# Patient Record
Sex: MALE | Race: WHITE | NOT HISPANIC OR LATINO | Employment: FULL TIME | URBAN - METROPOLITAN AREA
[De-identification: names, ages, dates, MRNs, and addresses within clinical notes are randomized per-mention and may not be internally consistent; named-entity substitution may affect disease eponyms.]

---

## 2018-07-19 LAB — HEMOCCULT STL QL IA: NEGATIVE

## 2019-04-09 RX ORDER — FLUTICASONE PROPIONATE 50 MCG
SPRAY, SUSPENSION (ML) NASAL
Qty: 16 ML | Refills: 2 | Status: SHIPPED | OUTPATIENT
Start: 2019-04-09 | End: 2021-04-13

## 2019-05-20 ENCOUNTER — OFFICE VISIT (OUTPATIENT)
Dept: FAMILY MEDICINE | Facility: CLINIC | Age: 54
End: 2019-05-20
Payer: COMMERCIAL

## 2019-05-20 VITALS
TEMPERATURE: 98 F | DIASTOLIC BLOOD PRESSURE: 88 MMHG | OXYGEN SATURATION: 97 % | HEIGHT: 71 IN | HEART RATE: 81 BPM | WEIGHT: 141 LBS | BODY MASS INDEX: 19.74 KG/M2 | SYSTOLIC BLOOD PRESSURE: 108 MMHG

## 2019-05-20 DIAGNOSIS — E87.6 HYPOKALEMIA: Primary | ICD-10-CM

## 2019-05-20 DIAGNOSIS — J30.2 SEASONAL ALLERGIES: ICD-10-CM

## 2019-05-20 PROBLEM — I10 BENIGN ESSENTIAL HYPERTENSION: Status: ACTIVE | Noted: 2019-05-20

## 2019-05-20 PROBLEM — E78.00 PURE HYPERCHOLESTEROLEMIA: Status: ACTIVE | Noted: 2019-05-20

## 2019-05-20 PROBLEM — M25.569 KNEE PAIN: Status: ACTIVE | Noted: 2019-05-20

## 2019-05-20 PROBLEM — M54.2 NECK PAIN: Status: ACTIVE | Noted: 2019-05-20

## 2019-05-20 PROBLEM — R21 RASH: Status: ACTIVE | Noted: 2019-05-20

## 2019-05-20 PROCEDURE — 3079F PR MOST RECENT DIASTOLIC BLOOD PRESSURE 80-89 MM HG: ICD-10-PCS | Mod: CPTII,S$GLB,, | Performed by: NURSE PRACTITIONER

## 2019-05-20 PROCEDURE — 3074F PR MOST RECENT SYSTOLIC BLOOD PRESSURE < 130 MM HG: ICD-10-PCS | Mod: CPTII,S$GLB,, | Performed by: NURSE PRACTITIONER

## 2019-05-20 PROCEDURE — 99213 PR OFFICE/OUTPT VISIT, EST, LEVL III, 20-29 MIN: ICD-10-PCS | Mod: S$GLB,,, | Performed by: NURSE PRACTITIONER

## 2019-05-20 PROCEDURE — 3074F SYST BP LT 130 MM HG: CPT | Mod: CPTII,S$GLB,, | Performed by: NURSE PRACTITIONER

## 2019-05-20 PROCEDURE — 3008F BODY MASS INDEX DOCD: CPT | Mod: CPTII,S$GLB,, | Performed by: NURSE PRACTITIONER

## 2019-05-20 PROCEDURE — 3008F PR BODY MASS INDEX (BMI) DOCUMENTED: ICD-10-PCS | Mod: CPTII,S$GLB,, | Performed by: NURSE PRACTITIONER

## 2019-05-20 PROCEDURE — 3079F DIAST BP 80-89 MM HG: CPT | Mod: CPTII,S$GLB,, | Performed by: NURSE PRACTITIONER

## 2019-05-20 PROCEDURE — 99213 OFFICE O/P EST LOW 20 MIN: CPT | Mod: S$GLB,,, | Performed by: NURSE PRACTITIONER

## 2019-05-20 RX ORDER — LEVOCETIRIZINE DIHYDROCHLORIDE 5 MG/1
1 TABLET, FILM COATED ORAL DAILY PRN
COMMUNITY
End: 2019-09-11

## 2019-05-20 RX ORDER — POTASSIUM CHLORIDE 20 MEQ/1
2 TABLET, EXTENDED RELEASE ORAL DAILY PRN
COMMUNITY
End: 2019-05-23 | Stop reason: SDUPTHER

## 2019-05-20 NOTE — PROGRESS NOTES
Subjective:       Patient ID: Jose Acosta is a 53 y.o. male.    Chief Complaint: 3 mo follow up    HPI   Three month f/u for hypokalemia and seasonal allergies. He has been eating K fortified foods and taking daily supplement. No longer having cramps in extremities.     Currently on levocetirizine and fluticasone on demand for seasonal allergies. Allergy symptoms seemed to improve after January. No serious sinus related illnesses.     Review of Systems   Constitutional: Negative for activity change, appetite change, chills, fatigue and fever.   HENT: Positive for rhinorrhea. Negative for congestion, ear pain, facial swelling, sinus pressure, sinus pain and sneezing.    Respiratory: Negative for apnea, cough, chest tightness, shortness of breath and wheezing.    Cardiovascular: Negative for chest pain, palpitations and leg swelling.   Gastrointestinal: Negative for abdominal distention, abdominal pain, blood in stool, constipation, diarrhea and vomiting.   Genitourinary: Negative for difficulty urinating, dysuria, flank pain, frequency and urgency.   Musculoskeletal: Negative for arthralgias, back pain, gait problem, joint swelling and neck pain.   Skin: Negative for color change, pallor and rash.   Neurological: Negative for dizziness, tremors, syncope, weakness, light-headedness and numbness.   Hematological: Negative for adenopathy. Does not bruise/bleed easily.   Psychiatric/Behavioral: Negative for agitation, confusion and sleep disturbance. The patient is not nervous/anxious.        Objective:      Physical Exam   Constitutional: He is oriented to person, place, and time. Vital signs are normal. He appears well-developed and well-nourished.   HENT:   Head: Normocephalic and atraumatic.   Mouth/Throat: Oropharynx is clear and moist.   Eyes: Pupils are equal, round, and reactive to light. Conjunctivae are normal.   Neck: Trachea normal and normal range of motion. Neck supple. Carotid bruit is not present.    Cardiovascular: Normal rate, regular rhythm and normal heart sounds.   Pulmonary/Chest: Effort normal and breath sounds normal.   Abdominal: Soft. Bowel sounds are normal.   Musculoskeletal: Normal range of motion.   Neurological: He is alert and oriented to person, place, and time.   Skin: Skin is warm, dry and intact.   Psychiatric: He has a normal mood and affect. His speech is normal and behavior is normal. Judgment normal.       Assessment:       1. Hypokalemia    2. Seasonal allergies        Plan:       PROBLEM LIST     Jose was seen today for 3 mo follow up.    Diagnoses and all orders for this visit:    Hypokalemia  -     Comprehensive metabolic panel; Future  -     Comprehensive metabolic panel  Rechecking CMP; will notify w/ results    Seasonal allergies  Controlled well on current regimen; continue.

## 2019-05-23 RX ORDER — POTASSIUM CHLORIDE 20 MEQ/1
TABLET, EXTENDED RELEASE ORAL
Qty: 180 TABLET | Refills: 1 | Status: SHIPPED | OUTPATIENT
Start: 2019-05-23 | End: 2019-09-11

## 2019-08-13 ENCOUNTER — OFFICE VISIT (OUTPATIENT)
Dept: FAMILY MEDICINE | Facility: CLINIC | Age: 54
End: 2019-08-13
Payer: COMMERCIAL

## 2019-08-13 VITALS
DIASTOLIC BLOOD PRESSURE: 82 MMHG | SYSTOLIC BLOOD PRESSURE: 118 MMHG | TEMPERATURE: 98 F | BODY MASS INDEX: 19.99 KG/M2 | HEIGHT: 71 IN | RESPIRATION RATE: 18 BRPM | WEIGHT: 142.81 LBS | HEART RATE: 83 BPM | OXYGEN SATURATION: 98 %

## 2019-08-13 DIAGNOSIS — N49.2 NODULE OF SCROTUM: Primary | ICD-10-CM

## 2019-08-13 PROCEDURE — 99213 PR OFFICE/OUTPT VISIT, EST, LEVL III, 20-29 MIN: ICD-10-PCS | Mod: S$GLB,,, | Performed by: NURSE PRACTITIONER

## 2019-08-13 PROCEDURE — 3008F PR BODY MASS INDEX (BMI) DOCUMENTED: ICD-10-PCS | Mod: CPTII,S$GLB,, | Performed by: NURSE PRACTITIONER

## 2019-08-13 PROCEDURE — 99213 OFFICE O/P EST LOW 20 MIN: CPT | Mod: S$GLB,,, | Performed by: NURSE PRACTITIONER

## 2019-08-13 PROCEDURE — 3008F BODY MASS INDEX DOCD: CPT | Mod: CPTII,S$GLB,, | Performed by: NURSE PRACTITIONER

## 2019-08-13 RX ORDER — ONDANSETRON HYDROCHLORIDE 8 MG/1
1 TABLET, FILM COATED ORAL EVERY 8 HOURS PRN
COMMUNITY
End: 2019-09-11

## 2019-08-13 RX ORDER — BENZONATATE 200 MG/1
1 CAPSULE ORAL 3 TIMES DAILY PRN
COMMUNITY
End: 2019-09-11

## 2019-08-13 RX ORDER — KETOCONAZOLE 20 MG/ML
1 SHAMPOO, SUSPENSION TOPICAL DAILY
COMMUNITY
End: 2019-09-11

## 2019-08-13 NOTE — PROGRESS NOTES
"Subjective:       Patient ID: Jose Acosta is a 54 y.o. male.    Chief Complaint: Groin Pain (Pt c/o a "discomfort" in his groin behind his scrotum more on R side, for about 2 weeks. Stated "not exactly a pain, more of a discomfort when I'm sitting or walking." C/o can feel a lump there that is tender to touch.) and Cough (nasal and chest congestion with a lingering cough that won't clear up )    HPI   Groin Pain (Pt c/o a "discomfort" in his groin behind his scrotum more on R side, for about 2 weeks. Stated "not exactly a pain, more of a discomfort when I'm sitting or walking." C/o can feel a lump there that is tender to touch.) and Cough (nasal and chest congestion with a lingering cough that won't clear up )      Review of Systems   Constitutional: Negative.    HENT: Positive for congestion (improving). Negative for sinus pressure, sinus pain and sneezing.    Respiratory: Positive for cough. Negative for shortness of breath and wheezing.    Cardiovascular: Negative for chest pain.   Genitourinary: Negative for decreased urine volume, difficulty urinating, discharge, dysuria, enuresis, flank pain, frequency, genital sores, hematuria, penile pain, penile swelling, scrotal swelling, testicular pain and urgency.        Scrotal discomfort and nodule   Skin: Negative for color change, rash and wound.   Hematological: Negative for adenopathy.       Objective:      Physical Exam   Constitutional: He appears well-developed and well-nourished.   Neck: Normal range of motion. Neck supple.   Pulmonary/Chest: Effort normal.   Abdominal: Soft. Bowel sounds are normal.   Genitourinary: Rectum normal and penis normal. No penile tenderness.   Genitourinary Comments: Small 7 mm rubbery nodule right posterior scrotum, mild tenderness on palpation. Deep, no skin changes.        Assessment:       1. Nodule of scrotum        Plan:       PROBLEM LIST     Jose was seen today for groin pain and cough.    Diagnoses and all orders for this " visit:    Nodule of scrotum  -     US Scrotum And Testicles; Future  -     US Scrotum And Testicles    ordering US, no evidence of candidal intertrigo, abscess, or cellulitis. Results will dictate clinical course.

## 2019-08-19 DIAGNOSIS — N49.2 NODULE OF SCROTUM: Primary | ICD-10-CM

## 2019-09-11 ENCOUNTER — OFFICE VISIT (OUTPATIENT)
Dept: FAMILY MEDICINE | Facility: CLINIC | Age: 54
End: 2019-09-11
Payer: COMMERCIAL

## 2019-09-11 VITALS
HEART RATE: 84 BPM | BODY MASS INDEX: 19.32 KG/M2 | HEIGHT: 71 IN | DIASTOLIC BLOOD PRESSURE: 81 MMHG | OXYGEN SATURATION: 98 % | SYSTOLIC BLOOD PRESSURE: 120 MMHG | WEIGHT: 138 LBS | TEMPERATURE: 98 F | RESPIRATION RATE: 14 BRPM

## 2019-09-11 DIAGNOSIS — J01.10 ACUTE NON-RECURRENT FRONTAL SINUSITIS: Primary | ICD-10-CM

## 2019-09-11 PROCEDURE — 96372 PR INJECTION,THERAP/PROPH/DIAG2ST, IM OR SUBCUT: ICD-10-PCS | Mod: S$GLB,,, | Performed by: NURSE PRACTITIONER

## 2019-09-11 PROCEDURE — 99213 OFFICE O/P EST LOW 20 MIN: CPT | Mod: 25,S$GLB,, | Performed by: NURSE PRACTITIONER

## 2019-09-11 PROCEDURE — 99213 PR OFFICE/OUTPT VISIT, EST, LEVL III, 20-29 MIN: ICD-10-PCS | Mod: 25,S$GLB,, | Performed by: NURSE PRACTITIONER

## 2019-09-11 PROCEDURE — 3008F PR BODY MASS INDEX (BMI) DOCUMENTED: ICD-10-PCS | Mod: CPTII,S$GLB,, | Performed by: NURSE PRACTITIONER

## 2019-09-11 PROCEDURE — 96372 THER/PROPH/DIAG INJ SC/IM: CPT | Mod: S$GLB,,, | Performed by: NURSE PRACTITIONER

## 2019-09-11 PROCEDURE — 3008F BODY MASS INDEX DOCD: CPT | Mod: CPTII,S$GLB,, | Performed by: NURSE PRACTITIONER

## 2019-09-11 RX ORDER — MONTELUKAST SODIUM 10 MG/1
10 TABLET ORAL NIGHTLY
COMMUNITY
End: 2019-11-20 | Stop reason: SDUPTHER

## 2019-09-11 RX ORDER — AZITHROMYCIN 1 G/1
1 POWDER, FOR SUSPENSION ORAL ONCE
Qty: 1 PACKET | Refills: 0 | Status: SHIPPED | OUTPATIENT
Start: 2019-09-11 | End: 2019-09-11

## 2019-09-11 RX ORDER — BETAMETHASONE SODIUM PHOSPHATE AND BETAMETHASONE ACETATE 3; 3 MG/ML; MG/ML
6 INJECTION, SUSPENSION INTRA-ARTICULAR; INTRALESIONAL; INTRAMUSCULAR; SOFT TISSUE
Status: COMPLETED | OUTPATIENT
Start: 2019-09-11 | End: 2019-09-11

## 2019-09-11 RX ORDER — CIPROFLOXACIN 500 MG/1
500 TABLET ORAL
COMMUNITY
End: 2021-04-13

## 2019-09-11 RX ADMIN — BETAMETHASONE SODIUM PHOSPHATE AND BETAMETHASONE ACETATE 6 MG: 3; 3 INJECTION, SUSPENSION INTRA-ARTICULAR; INTRALESIONAL; INTRAMUSCULAR; SOFT TISSUE at 09:09

## 2019-09-11 NOTE — PATIENT INSTRUCTIONS
For nasal congestion:  Flonase nasal spray daily.  Saline nasal spray throughout the day can also help.  Sleep with head elevated to promote drainage.  Gargle with warm salt water for throat irritation.  Increase fluid intake to help break up secretions.  Rest

## 2019-09-11 NOTE — PROGRESS NOTES
Subjective:       Jose Acosta is a 54 y.o. male who presents for evaluation of sinus pain. Symptoms include: congestion, facial pain, nasal congestion and sinus pressure. Onset of symptoms was 6 days ago. Symptoms have been gradually worsening since that time. Past history is significant for no history of pneumonia or bronchitis. Patient is a non-smoker.    Review of Systems  A comprehensive review of systems was negative except for: Ears, nose, mouth, throat, and face: positive for nasal congestion and sore throat     Objective:      General appearance: alert, appears stated age and cooperative  Head: Normocephalic, without obvious abnormality, atraumatic  Eyes: conjunctivae/corneas clear. PERRL, EOM's intact. Fundi benign.  Ears: normal TM's and external ear canals both ears  Nose: Nares normal. Septum midline. Mucosa normal. Moderate congestion, turbinates pale, swollen, sinus tenderness bilateral  Throat: abnormal findings: moderate amount of clear drainage noted  Neck: no adenopathy, no JVD, supple, symmetrical, trachea midline and thyroid not enlarged, symmetric, no tenderness/mass/nodules  Lungs: clear to auscultation bilaterally  Heart: regular rate and rhythm, S1, S2 normal, no murmur, click, rub or gallop  Skin: Skin color, texture, turgor normal. No rashes or lesions  Lymph nodes: Cervical, supraclavicular, and axillary nodes normal.  Neurologic: Alert and oriented X 3, normal strength and tone. Normal symmetric reflexes. Normal coordination and gait      Assessment:      Acute bacterial sinusitis.      Plan:      Azithromycin per medication orders.  Follow up in if symptoms fail to resolve  or as needed.  or as needed.  [facility] 6mg IM in office today    Continue Benzonatate and Flonase as previously prescribed  For nasal congestion:  Saline nasal spray throughout the day can also help.  Sleep with head elevated to promote drainage.  Gargle with warm salt water for throat irritation.  Increase fluid  intake to help break up secretions.  Rest

## 2019-11-20 ENCOUNTER — OFFICE VISIT (OUTPATIENT)
Dept: FAMILY MEDICINE | Facility: CLINIC | Age: 54
End: 2019-11-20
Payer: COMMERCIAL

## 2019-11-20 VITALS
HEIGHT: 71 IN | HEART RATE: 77 BPM | TEMPERATURE: 99 F | DIASTOLIC BLOOD PRESSURE: 75 MMHG | SYSTOLIC BLOOD PRESSURE: 114 MMHG | BODY MASS INDEX: 19.69 KG/M2 | WEIGHT: 140.63 LBS | RESPIRATION RATE: 16 BRPM | OXYGEN SATURATION: 96 %

## 2019-11-20 DIAGNOSIS — N49.2 NODULE OF SCROTUM: ICD-10-CM

## 2019-11-20 DIAGNOSIS — B37.0 THRUSH, ORAL: ICD-10-CM

## 2019-11-20 DIAGNOSIS — J30.2 SEASONAL ALLERGIES: Primary | ICD-10-CM

## 2019-11-20 DIAGNOSIS — E87.6 HYPOKALEMIA: ICD-10-CM

## 2019-11-20 PROCEDURE — 3008F BODY MASS INDEX DOCD: CPT | Mod: CPTII,S$GLB,, | Performed by: NURSE PRACTITIONER

## 2019-11-20 PROCEDURE — 99213 PR OFFICE/OUTPT VISIT, EST, LEVL III, 20-29 MIN: ICD-10-PCS | Mod: S$GLB,,, | Performed by: NURSE PRACTITIONER

## 2019-11-20 PROCEDURE — 99213 OFFICE O/P EST LOW 20 MIN: CPT | Mod: S$GLB,,, | Performed by: NURSE PRACTITIONER

## 2019-11-20 PROCEDURE — 3008F PR BODY MASS INDEX (BMI) DOCUMENTED: ICD-10-PCS | Mod: CPTII,S$GLB,, | Performed by: NURSE PRACTITIONER

## 2019-11-20 RX ORDER — MONTELUKAST SODIUM 10 MG/1
10 TABLET ORAL NIGHTLY
Qty: 90 TABLET | Refills: 3 | Status: SHIPPED | OUTPATIENT
Start: 2019-11-20 | End: 2020-09-25 | Stop reason: SDUPTHER

## 2019-11-20 RX ORDER — FLUTICASONE PROPIONATE 50 MCG
2 SPRAY, SUSPENSION (ML) NASAL DAILY
Qty: 15.8 ML | Refills: 6 | Status: SHIPPED | OUTPATIENT
Start: 2019-11-20 | End: 2021-04-13 | Stop reason: SDUPTHER

## 2019-11-20 RX ORDER — NYSTATIN 100000 [USP'U]/ML
6 SUSPENSION ORAL
Qty: 240 ML | Refills: 0 | Status: SHIPPED | OUTPATIENT
Start: 2019-11-20 | End: 2019-11-30

## 2019-11-20 NOTE — PROGRESS NOTES
Subjective:       Patient ID: Jose Acosta is a 54 y.o. male.    Chief Complaint: Follow-up (6 mth f/u, Med refill)    HPI   Here for seasonal allergies; needs refills on singulair and fluticasone. Currently on levocetirizine and fluticasone on demand for seasonal allergies. Runny nose, irritated throat. No wheezing or coughing. No fever.     Nodule in scrotum resolved uneventfully. Did not have to see the urologist.     Three month f/u for hypokalemia and seasonal allergies. He has been eating K fortified foods and taking daily supplement. No longer having cramps in extremities.        Review of Systems   Constitutional: Negative for activity change, appetite change, chills, fatigue and fever.   HENT: Positive for rhinorrhea. Negative for congestion, ear pain, facial swelling, sinus pressure, sinus pain and sneezing.    Respiratory: Negative for apnea, cough, chest tightness, shortness of breath and wheezing.    Cardiovascular: Negative for chest pain, palpitations and leg swelling.   Gastrointestinal: Negative for abdominal distention, abdominal pain, blood in stool, constipation, diarrhea and vomiting.   Genitourinary: Negative for difficulty urinating, dysuria, flank pain, frequency and urgency.   Musculoskeletal: Negative for arthralgias, back pain, gait problem, joint swelling and neck pain.   Skin: Negative for color change, pallor and rash.   Neurological: Negative for dizziness, tremors, syncope, weakness, light-headedness and numbness.   Hematological: Negative for adenopathy. Does not bruise/bleed easily.   Psychiatric/Behavioral: Negative for agitation, confusion and sleep disturbance. The patient is not nervous/anxious.        Objective:      Physical Exam   Constitutional: He is oriented to person, place, and time. Vital signs are normal. He appears well-developed and well-nourished.   HENT:   Head: Normocephalic and atraumatic.   Nose: Rhinorrhea present. No mucosal edema.   Mouth/Throat: Oropharynx  is clear and moist.   Red, inflamed papillae back of tongue   Eyes: Pupils are equal, round, and reactive to light. Conjunctivae are normal.   Neck: Trachea normal and normal range of motion. Neck supple. Carotid bruit is not present.   Cardiovascular: Normal rate, regular rhythm and normal heart sounds.   Pulmonary/Chest: Effort normal and breath sounds normal.   Abdominal: Soft. Bowel sounds are normal.   Musculoskeletal: Normal range of motion.   Neurological: He is alert and oriented to person, place, and time.   Skin: Skin is warm, dry and intact.   Psychiatric: He has a normal mood and affect. His speech is normal and behavior is normal. Judgment normal.       Assessment:       1. Seasonal allergies    2. Thrush, oral    3. Hypokalemia    4. Nodule of scrotum        Plan:       PROBLEM LIST     Jose was seen today for follow-up.    Diagnoses and all orders for this visit:    Seasonal allergies  -     montelukast (SINGULAIR) 10 mg tablet; Take 1 tablet (10 mg total) by mouth every evening.  -     fluticasone propionate (FLONASE) 50 mcg/actuation nasal spray; 2 sprays (100 mcg total) by Each Nostril route once daily.    Thrush, oral  -     nystatin (MYCOSTATIN) 100,000 unit/mL suspension; Take 6 mLs (600,000 Units total) by mouth 4 (four) times daily with meals and nightly. for 10 days    Hypokalemia  Will contact [facility] to get results. Will notify w/ results.     Nodule of scrotum  Comments:  resolved

## 2019-12-16 PROBLEM — J01.10 ACUTE NON-RECURRENT FRONTAL SINUSITIS: Status: RESOLVED | Noted: 2019-09-11 | Resolved: 2019-12-16

## 2020-03-04 ENCOUNTER — PATIENT MESSAGE (OUTPATIENT)
Dept: FAMILY MEDICINE | Facility: CLINIC | Age: 55
End: 2020-03-04

## 2020-04-09 ENCOUNTER — OFFICE VISIT (OUTPATIENT)
Dept: FAMILY MEDICINE | Facility: CLINIC | Age: 55
End: 2020-04-09
Payer: COMMERCIAL

## 2020-04-09 DIAGNOSIS — M54.2 CERVICALGIA: Primary | ICD-10-CM

## 2020-04-09 DIAGNOSIS — M47.812 SPONDYLOSIS OF CERVICAL REGION WITHOUT MYELOPATHY OR RADICULOPATHY: ICD-10-CM

## 2020-04-09 PROCEDURE — 99214 OFFICE O/P EST MOD 30 MIN: CPT | Mod: 95,,, | Performed by: NURSE PRACTITIONER

## 2020-04-09 PROCEDURE — 99214 PR OFFICE/OUTPT VISIT, EST, LEVL IV, 30-39 MIN: ICD-10-PCS | Mod: 95,,, | Performed by: NURSE PRACTITIONER

## 2020-04-09 RX ORDER — CYCLOBENZAPRINE HCL 10 MG
10 TABLET ORAL 3 TIMES DAILY PRN
Qty: 60 TABLET | Refills: 0 | Status: SHIPPED | OUTPATIENT
Start: 2020-04-09 | End: 2021-04-13

## 2020-04-09 RX ORDER — METHYLPREDNISOLONE 4 MG/1
TABLET ORAL
Qty: 21 TABLET | Refills: 0 | Status: SHIPPED | OUTPATIENT
Start: 2020-04-09 | End: 2021-04-13 | Stop reason: SDUPTHER
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
## 2020-09-25 ENCOUNTER — PATIENT MESSAGE (OUTPATIENT)
Dept: FAMILY MEDICINE | Facility: CLINIC | Age: 55
End: 2020-09-25

## 2020-09-25 DIAGNOSIS — K21.9 GASTROESOPHAGEAL REFLUX DISEASE WITHOUT ESOPHAGITIS: ICD-10-CM

## 2020-09-25 DIAGNOSIS — H10.33 ACUTE CONJUNCTIVITIS OF BOTH EYES, UNSPECIFIED ACUTE CONJUNCTIVITIS TYPE: Primary | ICD-10-CM

## 2020-09-25 DIAGNOSIS — J30.2 SEASONAL ALLERGIES: ICD-10-CM

## 2020-09-25 RX ORDER — OFLOXACIN 3 MG/ML
1 SOLUTION/ DROPS OPHTHALMIC 4 TIMES DAILY
Qty: 5 ML | Refills: 0 | Status: SHIPPED | OUTPATIENT
Start: 2020-09-25 | End: 2021-04-13

## 2020-09-25 RX ORDER — FAMOTIDINE 40 MG/1
40 TABLET, FILM COATED ORAL DAILY
Qty: 90 TABLET | Refills: 3 | Status: SHIPPED | OUTPATIENT
Start: 2020-09-25 | End: 2021-04-13

## 2020-09-25 RX ORDER — MONTELUKAST SODIUM 10 MG/1
10 TABLET ORAL NIGHTLY
Qty: 90 TABLET | Refills: 3 | Status: SHIPPED | OUTPATIENT
Start: 2020-09-25 | End: 2021-07-08

## 2020-10-14 ENCOUNTER — PATIENT MESSAGE (OUTPATIENT)
Dept: FAMILY MEDICINE | Facility: CLINIC | Age: 55
End: 2020-10-14

## 2020-10-30 ENCOUNTER — PATIENT MESSAGE (OUTPATIENT)
Dept: ADMINISTRATIVE | Facility: HOSPITAL | Age: 55
End: 2020-10-30

## 2021-02-26 ENCOUNTER — PATIENT OUTREACH (OUTPATIENT)
Dept: ADMINISTRATIVE | Facility: HOSPITAL | Age: 56
End: 2021-02-26

## 2021-02-26 RX ORDER — ONDANSETRON HYDROCHLORIDE 8 MG/1
TABLET, FILM COATED ORAL
COMMUNITY
End: 2021-04-13

## 2021-02-26 RX ORDER — CYCLOBENZAPRINE HCL 10 MG
TABLET ORAL
COMMUNITY
End: 2021-04-13 | Stop reason: SDUPTHER

## 2021-02-26 RX ORDER — KETOCONAZOLE 20 MG/ML
SHAMPOO, SUSPENSION TOPICAL
COMMUNITY
End: 2021-07-08

## 2021-02-26 RX ORDER — POTASSIUM CHLORIDE 20 MEQ/1
TABLET, EXTENDED RELEASE ORAL
COMMUNITY
End: 2021-07-08 | Stop reason: SDUPTHER

## 2021-02-26 RX ORDER — BENZONATATE 200 MG/1
CAPSULE ORAL
COMMUNITY
End: 2021-07-08 | Stop reason: ALTCHOICE

## 2021-02-26 RX ORDER — FLUTICASONE PROPIONATE 50 MCG
SPRAY, SUSPENSION (ML) NASAL
COMMUNITY
End: 2021-04-13 | Stop reason: SDUPTHER

## 2021-02-26 RX ORDER — LEVOCETIRIZINE DIHYDROCHLORIDE 5 MG/1
TABLET, FILM COATED ORAL
COMMUNITY

## 2021-02-26 RX ORDER — BROMPHENIRAMINE MALEATE, PSEUDOEPHEDRINE HYDROCHLORIDE, AND DEXTROMETHORPHAN HYDROBROMIDE 2; 30; 10 MG/5ML; MG/5ML; MG/5ML
SYRUP ORAL
COMMUNITY
End: 2021-04-13

## 2021-03-17 ENCOUNTER — PATIENT MESSAGE (OUTPATIENT)
Dept: ADMINISTRATIVE | Facility: HOSPITAL | Age: 56
End: 2021-03-17

## 2021-03-17 ENCOUNTER — PATIENT MESSAGE (OUTPATIENT)
Dept: FAMILY MEDICINE | Facility: CLINIC | Age: 56
End: 2021-03-17

## 2021-03-21 ENCOUNTER — PATIENT MESSAGE (OUTPATIENT)
Dept: FAMILY MEDICINE | Facility: CLINIC | Age: 56
End: 2021-03-21

## 2021-04-13 ENCOUNTER — CLINICAL SUPPORT (OUTPATIENT)
Dept: FAMILY MEDICINE | Facility: CLINIC | Age: 56
End: 2021-04-13
Payer: COMMERCIAL

## 2021-04-13 ENCOUNTER — OFFICE VISIT (OUTPATIENT)
Dept: FAMILY MEDICINE | Facility: CLINIC | Age: 56
End: 2021-04-13
Payer: COMMERCIAL

## 2021-04-13 VITALS
BODY MASS INDEX: 19.46 KG/M2 | TEMPERATURE: 98 F | SYSTOLIC BLOOD PRESSURE: 127 MMHG | OXYGEN SATURATION: 95 % | DIASTOLIC BLOOD PRESSURE: 82 MMHG | HEART RATE: 97 BPM | HEIGHT: 71 IN | WEIGHT: 139 LBS

## 2021-04-13 DIAGNOSIS — R05.9 COUGH: Primary | ICD-10-CM

## 2021-04-13 DIAGNOSIS — R11.0 NAUSEA: ICD-10-CM

## 2021-04-13 DIAGNOSIS — Z20.822 ENCOUNTER FOR LABORATORY TESTING FOR COVID-19 VIRUS: ICD-10-CM

## 2021-04-13 DIAGNOSIS — K30 UPSET STOMACH: ICD-10-CM

## 2021-04-13 DIAGNOSIS — J20.9 ACUTE WHEEZY BRONCHITIS: Primary | ICD-10-CM

## 2021-04-13 PROCEDURE — 3008F BODY MASS INDEX DOCD: CPT | Mod: CPTII,S$GLB,, | Performed by: NURSE PRACTITIONER

## 2021-04-13 PROCEDURE — 99213 OFFICE O/P EST LOW 20 MIN: CPT | Mod: S$GLB,,, | Performed by: NURSE PRACTITIONER

## 2021-04-13 PROCEDURE — 99213 PR OFFICE/OUTPT VISIT, EST, LEVL III, 20-29 MIN: ICD-10-PCS | Mod: S$GLB,,, | Performed by: NURSE PRACTITIONER

## 2021-04-13 PROCEDURE — 99499 NO LOS: ICD-10-PCS | Mod: S$GLB,,, | Performed by: INTERNAL MEDICINE

## 2021-04-13 PROCEDURE — 87426 SARSCOV CORONAVIRUS AG IA: CPT | Mod: QW,S$GLB,, | Performed by: NURSE PRACTITIONER

## 2021-04-13 PROCEDURE — 87426 PR SARS-COV-2 COVID-19 IMMUNOASSAY QUAL/SEMIQUANT, MULT STEP METHOD: ICD-10-PCS | Mod: QW,S$GLB,, | Performed by: NURSE PRACTITIONER

## 2021-04-13 PROCEDURE — 3008F PR BODY MASS INDEX (BMI) DOCUMENTED: ICD-10-PCS | Mod: CPTII,S$GLB,, | Performed by: NURSE PRACTITIONER

## 2021-04-13 PROCEDURE — 99499 UNLISTED E&M SERVICE: CPT | Mod: S$GLB,,, | Performed by: INTERNAL MEDICINE

## 2021-04-13 RX ORDER — METHYLPREDNISOLONE 4 MG/1
TABLET ORAL
Qty: 21 TABLET | Refills: 0 | Status: SHIPPED | OUTPATIENT
Start: 2021-04-13 | End: 2021-07-08 | Stop reason: ALTCHOICE

## 2021-04-13 RX ORDER — PROMETHAZINE HYDROCHLORIDE AND DEXTROMETHORPHAN HYDROBROMIDE 6.25; 15 MG/5ML; MG/5ML
5 SYRUP ORAL EVERY 4 HOURS PRN
Qty: 240 ML | Refills: 0 | Status: SHIPPED | OUTPATIENT
Start: 2021-04-13 | End: 2021-04-23

## 2021-04-13 RX ORDER — BENZONATATE 200 MG/1
200 CAPSULE ORAL 3 TIMES DAILY PRN
Qty: 30 CAPSULE | Refills: 0 | Status: SHIPPED | OUTPATIENT
Start: 2021-04-13 | End: 2021-04-23

## 2021-04-13 RX ORDER — ONDANSETRON 8 MG/1
8 TABLET, ORALLY DISINTEGRATING ORAL 2 TIMES DAILY PRN
Qty: 20 TABLET | Refills: 0 | Status: SHIPPED | OUTPATIENT
Start: 2021-04-13 | End: 2021-07-08

## 2021-07-07 ENCOUNTER — TELEPHONE (OUTPATIENT)
Dept: FAMILY MEDICINE | Facility: CLINIC | Age: 56
End: 2021-07-07

## 2021-07-08 ENCOUNTER — OFFICE VISIT (OUTPATIENT)
Dept: FAMILY MEDICINE | Facility: CLINIC | Age: 56
End: 2021-07-08
Payer: COMMERCIAL

## 2021-07-08 VITALS
WEIGHT: 141.19 LBS | OXYGEN SATURATION: 98 % | TEMPERATURE: 98 F | HEIGHT: 71 IN | HEART RATE: 84 BPM | BODY MASS INDEX: 19.77 KG/M2 | SYSTOLIC BLOOD PRESSURE: 114 MMHG | DIASTOLIC BLOOD PRESSURE: 73 MMHG

## 2021-07-08 DIAGNOSIS — J02.9 SORE THROAT: Primary | ICD-10-CM

## 2021-07-08 DIAGNOSIS — J02.9 VIRAL PHARYNGITIS: ICD-10-CM

## 2021-07-08 DIAGNOSIS — E87.6 HYPOKALEMIA: ICD-10-CM

## 2021-07-08 LAB
CTP QC/QA: YES
S PYO RRNA THROAT QL PROBE: NEGATIVE

## 2021-07-08 PROCEDURE — 3008F PR BODY MASS INDEX (BMI) DOCUMENTED: ICD-10-PCS | Mod: CPTII,S$GLB,, | Performed by: NURSE PRACTITIONER

## 2021-07-08 PROCEDURE — 96372 THER/PROPH/DIAG INJ SC/IM: CPT | Mod: S$GLB,,, | Performed by: NURSE PRACTITIONER

## 2021-07-08 PROCEDURE — 87880 STREP A ASSAY W/OPTIC: CPT | Mod: QW,,, | Performed by: NURSE PRACTITIONER

## 2021-07-08 PROCEDURE — 99213 OFFICE O/P EST LOW 20 MIN: CPT | Mod: 25,S$GLB,, | Performed by: NURSE PRACTITIONER

## 2021-07-08 PROCEDURE — 3008F BODY MASS INDEX DOCD: CPT | Mod: CPTII,S$GLB,, | Performed by: NURSE PRACTITIONER

## 2021-07-08 PROCEDURE — 99213 PR OFFICE/OUTPT VISIT, EST, LEVL III, 20-29 MIN: ICD-10-PCS | Mod: 25,S$GLB,, | Performed by: NURSE PRACTITIONER

## 2021-07-08 PROCEDURE — 96372 PR INJECTION,THERAP/PROPH/DIAG2ST, IM OR SUBCUT: ICD-10-PCS | Mod: S$GLB,,, | Performed by: NURSE PRACTITIONER

## 2021-07-08 PROCEDURE — 87880 POCT RAPID STREP A: ICD-10-PCS | Mod: QW,,, | Performed by: NURSE PRACTITIONER

## 2021-07-08 RX ORDER — DEXAMETHASONE SODIUM PHOSPHATE 4 MG/ML
4 INJECTION, SOLUTION INTRA-ARTICULAR; INTRALESIONAL; INTRAMUSCULAR; INTRAVENOUS; SOFT TISSUE
Status: COMPLETED | OUTPATIENT
Start: 2021-07-08 | End: 2021-07-08

## 2021-07-08 RX ORDER — POTASSIUM CHLORIDE 20 MEQ/1
TABLET, EXTENDED RELEASE ORAL
Qty: 90 TABLET | Refills: 1 | Status: SHIPPED | OUTPATIENT
Start: 2021-07-08 | End: 2021-09-17 | Stop reason: SDUPTHER

## 2021-07-08 RX ADMIN — DEXAMETHASONE SODIUM PHOSPHATE 4 MG: 4 INJECTION, SOLUTION INTRA-ARTICULAR; INTRALESIONAL; INTRAMUSCULAR; INTRAVENOUS; SOFT TISSUE at 03:07

## 2021-09-15 ENCOUNTER — PATIENT MESSAGE (OUTPATIENT)
Dept: FAMILY MEDICINE | Facility: CLINIC | Age: 56
End: 2021-09-15

## 2021-09-17 DIAGNOSIS — E87.6 HYPOKALEMIA: ICD-10-CM

## 2021-09-17 RX ORDER — POTASSIUM CHLORIDE 20 MEQ/1
20 TABLET, EXTENDED RELEASE ORAL DAILY
Qty: 90 TABLET | Refills: 3 | Status: SHIPPED | OUTPATIENT
Start: 2021-09-17 | End: 2022-08-02 | Stop reason: SDUPTHER

## 2021-12-13 ENCOUNTER — TELEPHONE (OUTPATIENT)
Dept: FAMILY MEDICINE | Facility: CLINIC | Age: 56
End: 2021-12-13

## 2021-12-13 ENCOUNTER — OFFICE VISIT (OUTPATIENT)
Dept: FAMILY MEDICINE | Facility: CLINIC | Age: 56
End: 2021-12-13
Payer: COMMERCIAL

## 2021-12-13 ENCOUNTER — TELEPHONE (OUTPATIENT)
Dept: FAMILY MEDICINE | Facility: CLINIC | Age: 56
End: 2021-12-13
Payer: COMMERCIAL

## 2021-12-13 VITALS
WEIGHT: 145 LBS | HEIGHT: 71 IN | HEART RATE: 82 BPM | OXYGEN SATURATION: 95 % | RESPIRATION RATE: 18 BRPM | BODY MASS INDEX: 20.3 KG/M2 | DIASTOLIC BLOOD PRESSURE: 80 MMHG | TEMPERATURE: 98 F | SYSTOLIC BLOOD PRESSURE: 126 MMHG

## 2021-12-13 DIAGNOSIS — Z12.5 PROSTATE CANCER SCREENING: ICD-10-CM

## 2021-12-13 DIAGNOSIS — Z11.59 ENCOUNTER FOR SCREENING FOR OTHER VIRAL DISEASES: ICD-10-CM

## 2021-12-13 DIAGNOSIS — H00.012 HORDEOLUM OF RIGHT LOWER EYELID, UNSPECIFIED HORDEOLUM TYPE: Primary | ICD-10-CM

## 2021-12-13 DIAGNOSIS — Z00.00 ROUTINE ADULT HEALTH MAINTENANCE: ICD-10-CM

## 2021-12-13 PROCEDURE — 99213 OFFICE O/P EST LOW 20 MIN: CPT | Mod: S$GLB,,, | Performed by: NURSE PRACTITIONER

## 2021-12-13 PROCEDURE — 99213 PR OFFICE/OUTPT VISIT, EST, LEVL III, 20-29 MIN: ICD-10-PCS | Mod: S$GLB,,, | Performed by: NURSE PRACTITIONER

## 2021-12-13 RX ORDER — ERYTHROMYCIN 5 MG/G
OINTMENT OPHTHALMIC 3 TIMES DAILY
Qty: 1 G | Refills: 0 | Status: SHIPPED | OUTPATIENT
Start: 2021-12-13 | End: 2022-01-24

## 2022-01-10 ENCOUNTER — PATIENT MESSAGE (OUTPATIENT)
Dept: FAMILY MEDICINE | Facility: CLINIC | Age: 57
End: 2022-01-10
Payer: COMMERCIAL

## 2022-01-11 ENCOUNTER — PATIENT MESSAGE (OUTPATIENT)
Dept: FAMILY MEDICINE | Facility: CLINIC | Age: 57
End: 2022-01-11
Payer: COMMERCIAL

## 2022-01-11 LAB
ABS NRBC COUNT: 0 X 10 3/UL (ref 0–0.01)
ABSOLUTE BASOPHIL: 0.04 X 10 3/UL (ref 0–0.22)
ABSOLUTE EOSINOPHIL: 0.45 X 10 3/UL (ref 0.04–0.54)
ABSOLUTE IMMATURE GRAN: 0.07 X 10 3/UL (ref 0–0.04)
ABSOLUTE LYMPHOCYTE: 2.82 X 10 3/UL (ref 0.86–4.75)
ABSOLUTE MONOCYTE: 0.75 X 10 3/UL (ref 0.22–1.08)
ALBUMIN SERPL-MCNC: 4.5 G/DL (ref 3.5–5.2)
ALBUMIN/GLOB SERPL ELPH: 1.4 {RATIO} (ref 1–2.7)
ALP ISOS SERPL LEV INH-CCNC: 81 U/L (ref 40–130)
ALT (SGPT): 18 U/L (ref 0–41)
ANION GAP SERPL CALC-SCNC: 10 MMOL/L (ref 8–17)
AST SERPL-CCNC: 13 U/L (ref 0–40)
BASOPHILS NFR BLD: 0.5 % (ref 0.2–1.2)
BILIRUBIN, TOTAL: 0.29 MG/DL (ref 0–1.2)
BUN/CREAT SERPL: 14.3 (ref 6–20)
CALCIUM SERPL-MCNC: 9.3 MG/DL (ref 8.6–10.2)
CARBON DIOXIDE, CO2: 31 MMOL/L (ref 22–29)
CHLORIDE: 100 MMOL/L (ref 98–107)
CHOLEST SERPL-MSCNC: 221 MG/DL (ref 100–200)
CREAT SERPL-MCNC: 1.15 MG/DL (ref 0.7–1.2)
EOSINOPHIL NFR BLD: 6.1 % (ref 0.7–7)
GFR ESTIMATION: 66.27
GLOBULIN: 3.3 G/DL (ref 1.5–4.5)
GLUCOSE: 100 MG/DL (ref 74–106)
HCT VFR BLD AUTO: 53.4 % (ref 42–52)
HCV IGG SERPL QL IA: NONREACTIVE
HDLC SERPL-MCNC: 51 MG/DL
HGB BLD-MCNC: 17.1 G/DL (ref 14–18)
HIV 1+2 AB+HIV1 P24 AG SERPL QL IA: NONREACTIVE
IMMATURE GRANULOCYTES: 1 % (ref 0–0.5)
LDL/HDL RATIO: 2.7 (ref 1–3)
LDLC SERPL CALC-MCNC: 140.2 MG/DL (ref 0–100)
LYMPHOCYTES NFR BLD: 38.5 % (ref 19.3–53.1)
MCH RBC QN AUTO: 28.6 PG (ref 27–32)
MCHC RBC AUTO-ENTMCNC: 32 G/DL (ref 32–36)
MCV RBC AUTO: 89.3 FL (ref 80–94)
MONOCYTES NFR BLD: 10.2 % (ref 4.7–12.5)
NEUTROPHILS # BLD AUTO: 3.2 X 10 3/UL (ref 2.15–7.56)
NEUTROPHILS NFR BLD: 43.7 % (ref 34–71.1)
NUCLEATED RED BLOOD CELLS: 0 /100 WBC (ref 0–0.2)
PLATELET # BLD AUTO: 352 X 10 3/UL (ref 135–400)
POTASSIUM: 4.4 MMOL/L (ref 3.5–5.1)
PROT SNV-MCNC: 7.8 G/DL (ref 6.4–8.3)
PSA, DIAGNOSTIC: 1.45 NG/ML (ref 0–4)
RBC # BLD AUTO: 5.98 X 10 6/UL (ref 4.7–6.1)
RDW-SD: 42 FL (ref 37–54)
SODIUM: 141 MMOL/L (ref 136–145)
TRIGL SERPL-MCNC: 149 MG/DL (ref 0–150)
TSH W/REFLEX TO FT4: 2.1 UIU/ML (ref 0.27–4.2)
UREA NITROGEN (BUN): 16.5 MG/DL (ref 6–20)
WBC # BLD: 7.33 X 10 3/UL (ref 4.3–10.8)

## 2022-01-21 ENCOUNTER — OFFICE VISIT (OUTPATIENT)
Dept: FAMILY MEDICINE | Facility: CLINIC | Age: 57
End: 2022-01-21
Payer: COMMERCIAL

## 2022-01-21 VITALS
DIASTOLIC BLOOD PRESSURE: 72 MMHG | RESPIRATION RATE: 18 BRPM | WEIGHT: 144 LBS | TEMPERATURE: 99 F | SYSTOLIC BLOOD PRESSURE: 135 MMHG | HEART RATE: 87 BPM | OXYGEN SATURATION: 98 % | HEIGHT: 71 IN | BODY MASS INDEX: 20.16 KG/M2

## 2022-01-21 DIAGNOSIS — M54.2 NECK PAIN: Primary | ICD-10-CM

## 2022-01-21 DIAGNOSIS — E78.00 PURE HYPERCHOLESTEROLEMIA: Chronic | ICD-10-CM

## 2022-01-21 DIAGNOSIS — M50.30 DDD (DEGENERATIVE DISC DISEASE), CERVICAL: ICD-10-CM

## 2022-01-21 DIAGNOSIS — Z12.11 COLON CANCER SCREENING: ICD-10-CM

## 2022-01-21 PROCEDURE — 3078F DIAST BP <80 MM HG: CPT | Mod: CPTII,S$GLB,, | Performed by: NURSE PRACTITIONER

## 2022-01-21 PROCEDURE — 3008F BODY MASS INDEX DOCD: CPT | Mod: CPTII,S$GLB,, | Performed by: NURSE PRACTITIONER

## 2022-01-21 PROCEDURE — 3078F PR MOST RECENT DIASTOLIC BLOOD PRESSURE < 80 MM HG: ICD-10-PCS | Mod: CPTII,S$GLB,, | Performed by: NURSE PRACTITIONER

## 2022-01-21 PROCEDURE — 3075F SYST BP GE 130 - 139MM HG: CPT | Mod: CPTII,S$GLB,, | Performed by: NURSE PRACTITIONER

## 2022-01-21 PROCEDURE — 3075F PR MOST RECENT SYSTOLIC BLOOD PRESS GE 130-139MM HG: ICD-10-PCS | Mod: CPTII,S$GLB,, | Performed by: NURSE PRACTITIONER

## 2022-01-21 PROCEDURE — 1159F MED LIST DOCD IN RCRD: CPT | Mod: CPTII,S$GLB,, | Performed by: NURSE PRACTITIONER

## 2022-01-21 PROCEDURE — 3008F PR BODY MASS INDEX (BMI) DOCUMENTED: ICD-10-PCS | Mod: CPTII,S$GLB,, | Performed by: NURSE PRACTITIONER

## 2022-01-21 PROCEDURE — 1159F PR MEDICATION LIST DOCUMENTED IN MEDICAL RECORD: ICD-10-PCS | Mod: CPTII,S$GLB,, | Performed by: NURSE PRACTITIONER

## 2022-01-21 PROCEDURE — 99213 OFFICE O/P EST LOW 20 MIN: CPT | Mod: S$GLB,,, | Performed by: NURSE PRACTITIONER

## 2022-01-21 PROCEDURE — 99213 PR OFFICE/OUTPT VISIT, EST, LEVL III, 20-29 MIN: ICD-10-PCS | Mod: S$GLB,,, | Performed by: NURSE PRACTITIONER

## 2022-01-21 RX ORDER — CYCLOBENZAPRINE HCL 10 MG
10 TABLET ORAL 2 TIMES DAILY PRN
Qty: 60 TABLET | Refills: 5 | Status: SHIPPED | OUTPATIENT
Start: 2022-01-21 | End: 2022-08-02 | Stop reason: SDUPTHER

## 2022-01-21 RX ORDER — CELECOXIB 100 MG/1
100 CAPSULE ORAL 2 TIMES DAILY
Qty: 60 CAPSULE | Refills: 5 | Status: SHIPPED | OUTPATIENT
Start: 2022-01-21 | End: 2022-08-02 | Stop reason: SDUPTHER
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
## 2022-01-24 ENCOUNTER — PATIENT MESSAGE (OUTPATIENT)
Dept: FAMILY MEDICINE | Facility: CLINIC | Age: 57
End: 2022-01-24
Payer: COMMERCIAL

## 2022-02-11 LAB — NONINV COLON CA DNA+OCC BLD SCRN STL QL: NEGATIVE

## 2022-04-29 ENCOUNTER — OFFICE VISIT (OUTPATIENT)
Dept: UROLOGY | Facility: CLINIC | Age: 57
End: 2022-04-29
Payer: COMMERCIAL

## 2022-04-29 VITALS
HEART RATE: 92 BPM | HEIGHT: 71 IN | BODY MASS INDEX: 18.9 KG/M2 | SYSTOLIC BLOOD PRESSURE: 110 MMHG | DIASTOLIC BLOOD PRESSURE: 71 MMHG | WEIGHT: 135 LBS

## 2022-04-29 DIAGNOSIS — R39.198 DIFFICULTY VOIDING: Primary | ICD-10-CM

## 2022-04-29 DIAGNOSIS — K59.00 CONSTIPATION, UNSPECIFIED CONSTIPATION TYPE: ICD-10-CM

## 2022-04-29 DIAGNOSIS — R31.29 HEMATURIA, MICROSCOPIC: ICD-10-CM

## 2022-04-29 LAB — POC RESIDUAL URINE VOLUME: 0 ML (ref 0–100)

## 2022-04-29 PROCEDURE — 3074F SYST BP LT 130 MM HG: CPT | Mod: CPTII,S$GLB,, | Performed by: NURSE PRACTITIONER

## 2022-04-29 PROCEDURE — 1160F PR REVIEW ALL MEDS BY PRESCRIBER/CLIN PHARMACIST DOCUMENTED: ICD-10-PCS | Mod: CPTII,S$GLB,, | Performed by: NURSE PRACTITIONER

## 2022-04-29 PROCEDURE — 51798 US URINE CAPACITY MEASURE: CPT | Mod: S$GLB,,, | Performed by: NURSE PRACTITIONER

## 2022-04-29 PROCEDURE — 3078F PR MOST RECENT DIASTOLIC BLOOD PRESSURE < 80 MM HG: ICD-10-PCS | Mod: CPTII,S$GLB,, | Performed by: NURSE PRACTITIONER

## 2022-04-29 PROCEDURE — 1160F RVW MEDS BY RX/DR IN RCRD: CPT | Mod: CPTII,S$GLB,, | Performed by: NURSE PRACTITIONER

## 2022-04-29 PROCEDURE — 99204 OFFICE O/P NEW MOD 45 MIN: CPT | Mod: S$GLB,,, | Performed by: NURSE PRACTITIONER

## 2022-04-29 PROCEDURE — 1159F PR MEDICATION LIST DOCUMENTED IN MEDICAL RECORD: ICD-10-PCS | Mod: CPTII,S$GLB,, | Performed by: NURSE PRACTITIONER

## 2022-04-29 PROCEDURE — 3008F BODY MASS INDEX DOCD: CPT | Mod: CPTII,S$GLB,, | Performed by: NURSE PRACTITIONER

## 2022-04-29 PROCEDURE — 3078F DIAST BP <80 MM HG: CPT | Mod: CPTII,S$GLB,, | Performed by: NURSE PRACTITIONER

## 2022-04-29 PROCEDURE — 3008F PR BODY MASS INDEX (BMI) DOCUMENTED: ICD-10-PCS | Mod: CPTII,S$GLB,, | Performed by: NURSE PRACTITIONER

## 2022-04-29 PROCEDURE — 3074F PR MOST RECENT SYSTOLIC BLOOD PRESSURE < 130 MM HG: ICD-10-PCS | Mod: CPTII,S$GLB,, | Performed by: NURSE PRACTITIONER

## 2022-04-29 PROCEDURE — 1159F MED LIST DOCD IN RCRD: CPT | Mod: CPTII,S$GLB,, | Performed by: NURSE PRACTITIONER

## 2022-04-29 PROCEDURE — 51798 POCT BLADDER SCAN: ICD-10-PCS | Mod: S$GLB,,, | Performed by: NURSE PRACTITIONER

## 2022-04-29 PROCEDURE — 99204 PR OFFICE/OUTPT VISIT, NEW, LEVL IV, 45-59 MIN: ICD-10-PCS | Mod: S$GLB,,, | Performed by: NURSE PRACTITIONER

## 2022-04-29 RX ORDER — TAMSULOSIN HYDROCHLORIDE 0.4 MG/1
0.4 CAPSULE ORAL DAILY
Qty: 90 CAPSULE | Refills: 3 | Status: SHIPPED | OUTPATIENT
Start: 2022-04-29 | End: 2022-06-10 | Stop reason: SDUPTHER

## 2022-04-29 RX ORDER — TAMSULOSIN HYDROCHLORIDE 0.4 MG/1
CAPSULE ORAL DAILY
COMMUNITY
End: 2022-04-29 | Stop reason: SDUPTHER

## 2022-04-29 NOTE — PROGRESS NOTES
Subjective:       Patient ID: Jose Acosta is a 56 y.o. male.    Chief Complaint: Other (Difficulty with voiding/pt reports he saw us in old building)      HPI: 56-year-old male, new to [facility], presents with complaint of difficulty voiding.  Patient states he is having difficulty voiding.  States he is having frequency with decreased output.  Patient has an old prescription of Flomax 0.4 mg daily.  He states he started taking this and had some improvement however he feels like he still has frequency with decreased output.    He denies any pain or burning urination.  Denies any odor urine.  Denies any fever.  Denies any body aches.  He states 18 cc blood in his urine.  He states his urine will be dark.  Does not describe it as red.    Patient states he has constipation issues.  States he tends to have urinary issues with constipation.    Patient does have a history of smoking.  He quit smoking approximately 20 years ago.  However, he smoked for approximately 15 years and would smoke 2+ packs per day.    No other urinary complaints at this time.       Past Medical History:   Past Medical History:   Diagnosis Date    Allergy     Chronic constipation     Rheumatoid arthritis involving multiple sites        Past Surgical Historical: History reviewed. No pertinent surgical history.     Medications:   Medication List with Changes/Refills   Current Medications    CELECOXIB (CELEBREX) 100 MG CAPSULE    Take 1 capsule (100 mg total) by mouth 2 (two) times daily.    CYCLOBENZAPRINE (FLEXERIL) 10 MG TABLET    Take 1 tablet (10 mg total) by mouth 2 (two) times daily as needed for Muscle spasms.    LEVOCETIRIZINE (XYZAL) 5 MG TABLET    levocetirizine 5 mg tablet   TAKE 1 TABLET BY MOUTH EVERY DAY IN THE EVENING    POTASSIUM CHLORIDE SA (K-DUR,KLOR-CON) 20 MEQ TABLET    Take 1 tablet (20 mEq total) by mouth once daily. potassium chloride ER 20 mEq tablet,extended release(part/cryst)   Changed and/or Refilled  Medications    Modified Medication Previous Medication    TAMSULOSIN (FLOMAX) 0.4 MG CAP tamsulosin (FLOMAX) 0.4 mg Cap       Take 1 capsule (0.4 mg total) by mouth once daily.    Take by mouth once daily.        Past Social History:   Social History     Socioeconomic History    Marital status:    Occupational History    Occupation:    Tobacco Use    Smoking status: Never Smoker    Smokeless tobacco: Never Used   Substance and Sexual Activity    Alcohol use: Not Currently     Comment: rarely    Drug use: Not Currently       Allergies:   Review of patient's allergies indicates:   Allergen Reactions    Codeine         Family History:   Family History   Problem Relation Age of Onset    Cancer Father     No Known Problems Mother         Review of Systems:  Review of Systems   Constitutional: Negative for activity change and appetite change.   HENT: Negative for congestion and dental problem.    Eyes: Negative for visual disturbance.   Respiratory: Negative for chest tightness and shortness of breath.    Cardiovascular: Negative for chest pain.   Gastrointestinal: Negative for abdominal distention and abdominal pain.   Genitourinary: Positive for difficulty urinating and frequency. Negative for decreased urine volume, dysuria, enuresis, flank pain, genital sores, hematuria, penile discharge, penile pain, penile swelling, scrotal swelling, testicular pain and urgency.   Musculoskeletal: Negative for back pain and neck pain.   Skin: Negative for color change.   Neurological: Negative for dizziness.   Hematological: Negative for adenopathy.   Psychiatric/Behavioral: Negative for agitation, behavioral problems and confusion.       Physical Exam:  Physical Exam  Vitals and nursing note reviewed.   Constitutional:       Appearance: He is well-developed.   HENT:      Head: Normocephalic.   Eyes:      Pupils: Pupils are equal, round, and reactive to light.   Cardiovascular:      Rate and Rhythm:  Normal rate and regular rhythm.      Heart sounds: Normal heart sounds.   Pulmonary:      Effort: Pulmonary effort is normal.      Breath sounds: Normal breath sounds.   Abdominal:      General: Bowel sounds are normal.      Palpations: Abdomen is soft.   Musculoskeletal:         General: Normal range of motion.      Cervical back: Normal range of motion and neck supple.   Skin:     General: Skin is warm and dry.   Neurological:      Mental Status: He is alert and oriented to person, place, and time.   Psychiatric:         Behavior: Behavior normal.       Urinalysis:  Small blood, red blood cells 10-20.  Bladder scan:  0 cc    Assessment/Plan:   1. Difficulty voiding:  Patient notes improvement with Flomax 0.4 mg daily.  States his medication is old and would like a new prescription.  Patient provided new prescription of Flomax 0.4 mg daily.  Did discuss the possibility of overactive bladder.  Also discussed constipation contributing.  Patient advised use over-the-counter MiraLax to help with constipation.  He is also advised to follow-up with GI doctor if he feels like this is a constant problem.    2. Microscopic hematuria:  Patient has small blood with red blood cells 10-20.  He is a former heavy smoker.  Will schedule patient for CT urogram and cysto.    3. Constipation:  Patient  advised use over-the-counter medications and follow-up with GI doctor.    Follow-up will arrange pending workup.  Patient advised to follow-up sooner if no improvement or symptoms get worse.  Problem List Items Addressed This Visit    None     Visit Diagnoses     Difficulty voiding    -  Primary    Relevant Medications    tamsulosin (FLOMAX) 0.4 mg Cap    Other Relevant Orders    POCT Urinalysis (w/Micro Option)    POCT Bladder Scan    Hematuria, microscopic        Relevant Orders    CT Urogram Abd Pelvis W WO    Cystoscopy    Constipation, unspecified constipation type

## 2022-05-20 ENCOUNTER — TELEPHONE (OUTPATIENT)
Dept: UROLOGY | Facility: CLINIC | Age: 57
End: 2022-05-20
Payer: COMMERCIAL

## 2022-05-20 NOTE — TELEPHONE ENCOUNTER
Patient notified of results and proceed with cysto. [facility] LPN    ----- Message from Bishnu Adame NP sent at 5/19/2022  1:11 PM CDT -----  2 small 8 mm lesions in right kidney.  Too small to characterize.  Will repeat CT in 6 months.    Proceed with cysto as scheduled.

## 2022-06-09 ENCOUNTER — PROCEDURE VISIT (OUTPATIENT)
Dept: UROLOGY | Facility: CLINIC | Age: 57
End: 2022-06-09
Payer: COMMERCIAL

## 2022-06-09 VITALS
HEART RATE: 83 BPM | BODY MASS INDEX: 19.88 KG/M2 | WEIGHT: 142 LBS | HEIGHT: 71 IN | SYSTOLIC BLOOD PRESSURE: 132 MMHG | DIASTOLIC BLOOD PRESSURE: 72 MMHG | RESPIRATION RATE: 20 BRPM | OXYGEN SATURATION: 99 %

## 2022-06-09 DIAGNOSIS — R31.29 HEMATURIA, MICROSCOPIC: ICD-10-CM

## 2022-06-09 PROCEDURE — 52000 CYSTOSCOPY: ICD-10-PCS | Mod: S$GLB,,, | Performed by: UROLOGY

## 2022-06-09 PROCEDURE — 52000 CYSTOURETHROSCOPY: CPT | Mod: S$GLB,,, | Performed by: UROLOGY

## 2022-06-09 NOTE — PROCEDURES
Cystoscopy    Date/Time: 6/9/2022 9:30 AM  Performed by: Benedicto Lindsey MD  Authorized by: Benedicto Lindsey MD     Consent Done?:  Yes (Written)  Timeout: prior to procedure the correct patient, procedure, and site was verified    Prep: patient was prepped and draped in usual sterile fashion    Anesthesia:  Intraurethral instillation  Indications: hematuria    Position:  Supine  Anesthesia:  Intraurethral instillation  Patient sedated?: No    Preparation: Patient was prepped and draped in usual sterile fashion    Scope type:  Flexible cystoscope  External exam normal: Yes    Urethra normal: Yes    Prostate normal: Yes    Comments:      Patient was brought to the procedure room placed on the table padded prepped and draped in usual sterile fashion in supine position. The cystoscope was inserted into the urethra and advanced the urethra was normal prostate showed expected enlargement for patient's age, the bladder is entered and inspected is found to be free of tumor stone or foreign body.  Bilateral ureteral orifices were identified and noted to be normal in appearance with clear efflux of urine at this point the scope was removed the patient tolerated the procedure well there were no complications    Impression:  Essentially a normal cystoscopy patient has an 8 mm lesion on his kidneys which he will need 6 month CT follow-up for.  Return to clinic in 6 months for this

## 2022-06-09 NOTE — PATIENT INSTRUCTIONS
Patient Education       Cystoscopy Discharge Instructions   About this topic   Your kidneys make urine. It is stored in your bladder. The urethra is a tube at the bottom of the bladder. Urine flows out of this tube. Sometimes, there is a blockage and urine is not able to leave the body.  A cystoscopy is a procedure that lets the doctor see the inside of your bladder and urethra. The doctor does it to:  Look for stones or tumors blocking the bladder and urethra  Look for changes or injury inside the bladder  Take a tissue sample from the inside of your bladder  Look for reasons for blood in the urine, pain with urination, or why you are passing urine often  Look for prostate problems     What care is needed at home?   Ask your doctor what you need to do when you go home. Make sure you ask questions if you do not understand what the doctor says. This way you will know what you need to do.  Take a warm bath or use a warm wet washcloth over the opening to the urethra. This will help to ease any pain. Do this as needed.  Drink 6 to 8 glasses of water a day and 3 to 4 glasses in the first few hours after the procedure to flush out your bladder and reduce irritation.  You may see some blood in your urine for a few days. This is normal.  Empty your bladder as soon as you feel the need to. Don't delay going to the bathroom. It stretches and weakens the bladder.  What follow-up care is needed?   Your doctor may ask you to make visits to the office to check on your progress. Be sure to keep these visits.  If you had a biopsy, talk with your doctor about the results.  What drugs may be needed?   The doctor may order drugs to:  Help with pain  Fight an infection  Help with bladder spasms  Will physical activity be limited?   Talk to your doctor about when you may go back to your normal activities like work, driving, or sex.  What problems could happen?   Bleeding  Infection  Injury to the bladder and urethra  Discomfort in the  urethra area  Burning sensation for a short time  Upset stomach  When do I need to call the doctor?   Signs of infection. These include a fever of 100.4°F (38°C) or higher, chills, pain with passing urine.  Pain that does not go away even with drugs or that lasts longer than 2 days  Too much blood in your urine  Passing large dime-sized clots  Cloudy urine  Little or no urine or not able to pass urine  Abdominal pain and nausea  Teach Back: Helping You Understand   The Teach Back Method helps you understand the information we are giving you. After you talk with the staff, tell them in your own words what you learned. This helps to make sure the staff has described each thing clearly. It also helps to explain things that may have been confusing. Before going home, make sure you can do these:  I can tell you about my procedure.  I can tell you what may help ease my pain.  I can tell you what I will do if I have a fever, chills, or am not able to pass urine.  Where can I learn more?   American Cancer Society  https://www.cancer.org/treatment/understanding-your-diagnosis/tests/endoscopy/cystoscopy.html   [employer]  https://www.cancerresearchuk.org/about-cancer/bladder-cancer/getting-diagnosed/tests-diagnose/cystoscopy   NHS Choices  http://www.nhs.uk/conditions/Cystoscopy/Pages/Introduction.aspx   Last Reviewed Date   2021-04-22  Consumer Information Use and Disclaimer   This information is not specific medical advice and does not replace information you receive from your health care provider. This is only a brief summary of general information. It does NOT include all information about conditions, illnesses, injuries, tests, procedures, treatments, therapies, discharge instructions or life-style choices that may apply to you. You must talk with your health care provider for complete information about your health and treatment options. This information should not be used to decide whether or not to accept your  health care providers advice, instructions or recommendations. Only your health care provider has the knowledge and training to provide advice that is right for you.  Copyright   Copyright © 2021 [employer] and its affiliates and/or licensors. All rights reserved.

## 2022-06-10 ENCOUNTER — TELEPHONE (OUTPATIENT)
Dept: UROLOGY | Facility: CLINIC | Age: 57
End: 2022-06-10
Payer: COMMERCIAL

## 2022-06-10 ENCOUNTER — CLINICAL SUPPORT (OUTPATIENT)
Dept: UROLOGY | Facility: CLINIC | Age: 57
End: 2022-06-10
Payer: COMMERCIAL

## 2022-06-10 DIAGNOSIS — R39.198 DIFFICULTY VOIDING: Primary | ICD-10-CM

## 2022-06-10 RX ORDER — TAMSULOSIN HYDROCHLORIDE 0.4 MG/1
0.4 CAPSULE ORAL DAILY
Qty: 90 CAPSULE | Refills: 3 | Status: SHIPPED | OUTPATIENT
Start: 2022-06-10 | End: 2022-12-13 | Stop reason: SDUPTHER

## 2022-06-10 NOTE — TELEPHONE ENCOUNTER
----- Message from Lucita Gill sent at 6/10/2022  8:20 AM CDT -----  Contact: pt  .Type:  Same Day Appointment Request    Caller is requesting a same day appointment.  Caller declined first available appointment listed below.    Name of Caller:graciela  When is the first available appointment?  Symptoms:problems urinating   Best Call Back Number:.[phone] (home)     Additional Information:

## 2022-06-10 NOTE — PROGRESS NOTES
Pt proceeded to clinic for ua drop off and bladder scan. Urine came back negative for infection. BS was 96. Dr. Lindsey was informed of this and called out flomax to pt pharmacy. Pt informed of this plan of care.

## 2022-06-16 ENCOUNTER — PATIENT MESSAGE (OUTPATIENT)
Dept: FAMILY MEDICINE | Facility: CLINIC | Age: 57
End: 2022-06-16
Payer: COMMERCIAL

## 2022-06-16 DIAGNOSIS — J06.9 UPPER RESPIRATORY TRACT INFECTION DUE TO COVID-19 VIRUS: Primary | ICD-10-CM

## 2022-06-16 DIAGNOSIS — U07.1 UPPER RESPIRATORY TRACT INFECTION DUE TO COVID-19 VIRUS: Primary | ICD-10-CM

## 2022-06-17 RX ORDER — PROMETHAZINE HYDROCHLORIDE AND DEXTROMETHORPHAN HYDROBROMIDE 6.25; 15 MG/5ML; MG/5ML
5 SYRUP ORAL EVERY 4 HOURS PRN
Qty: 240 ML | Refills: 0 | Status: SHIPPED | OUTPATIENT
Start: 2022-06-17 | End: 2022-06-27

## 2022-06-17 RX ORDER — METHYLPREDNISOLONE 4 MG/1
TABLET ORAL
Qty: 21 EACH | Refills: 0 | Status: SHIPPED | OUTPATIENT
Start: 2022-06-17 | End: 2022-07-07

## 2022-08-02 ENCOUNTER — OFFICE VISIT (OUTPATIENT)
Dept: FAMILY MEDICINE | Facility: CLINIC | Age: 57
End: 2022-08-02
Payer: COMMERCIAL

## 2022-08-02 VITALS
OXYGEN SATURATION: 97 % | TEMPERATURE: 97 F | HEIGHT: 71 IN | WEIGHT: 139 LBS | BODY MASS INDEX: 19.46 KG/M2 | SYSTOLIC BLOOD PRESSURE: 128 MMHG | RESPIRATION RATE: 18 BRPM | DIASTOLIC BLOOD PRESSURE: 79 MMHG | HEART RATE: 79 BPM

## 2022-08-02 DIAGNOSIS — E87.6 HYPOKALEMIA: Chronic | ICD-10-CM

## 2022-08-02 DIAGNOSIS — M50.30 DDD (DEGENERATIVE DISC DISEASE), CERVICAL: Primary | Chronic | ICD-10-CM

## 2022-08-02 DIAGNOSIS — E78.00 PURE HYPERCHOLESTEROLEMIA: Chronic | ICD-10-CM

## 2022-08-02 PROBLEM — I78.0 HEREDITARY HEMORRHAGIC TELANGIECTASIA: Status: ACTIVE | Noted: 2022-08-02

## 2022-08-02 PROCEDURE — 3008F BODY MASS INDEX DOCD: CPT | Mod: CPTII,S$GLB,, | Performed by: NURSE PRACTITIONER

## 2022-08-02 PROCEDURE — 3078F DIAST BP <80 MM HG: CPT | Mod: CPTII,S$GLB,, | Performed by: NURSE PRACTITIONER

## 2022-08-02 PROCEDURE — 1159F PR MEDICATION LIST DOCUMENTED IN MEDICAL RECORD: ICD-10-PCS | Mod: CPTII,S$GLB,, | Performed by: NURSE PRACTITIONER

## 2022-08-02 PROCEDURE — 3074F PR MOST RECENT SYSTOLIC BLOOD PRESSURE < 130 MM HG: ICD-10-PCS | Mod: CPTII,S$GLB,, | Performed by: NURSE PRACTITIONER

## 2022-08-02 PROCEDURE — 3008F PR BODY MASS INDEX (BMI) DOCUMENTED: ICD-10-PCS | Mod: CPTII,S$GLB,, | Performed by: NURSE PRACTITIONER

## 2022-08-02 PROCEDURE — 99214 PR OFFICE/OUTPT VISIT, EST, LEVL IV, 30-39 MIN: ICD-10-PCS | Mod: S$GLB,,, | Performed by: NURSE PRACTITIONER

## 2022-08-02 PROCEDURE — 99214 OFFICE O/P EST MOD 30 MIN: CPT | Mod: S$GLB,,, | Performed by: NURSE PRACTITIONER

## 2022-08-02 PROCEDURE — 3074F SYST BP LT 130 MM HG: CPT | Mod: CPTII,S$GLB,, | Performed by: NURSE PRACTITIONER

## 2022-08-02 PROCEDURE — 1159F MED LIST DOCD IN RCRD: CPT | Mod: CPTII,S$GLB,, | Performed by: NURSE PRACTITIONER

## 2022-08-02 PROCEDURE — 1160F RVW MEDS BY RX/DR IN RCRD: CPT | Mod: CPTII,S$GLB,, | Performed by: NURSE PRACTITIONER

## 2022-08-02 PROCEDURE — 3078F PR MOST RECENT DIASTOLIC BLOOD PRESSURE < 80 MM HG: ICD-10-PCS | Mod: CPTII,S$GLB,, | Performed by: NURSE PRACTITIONER

## 2022-08-02 PROCEDURE — 1160F PR REVIEW ALL MEDS BY PRESCRIBER/CLIN PHARMACIST DOCUMENTED: ICD-10-PCS | Mod: CPTII,S$GLB,, | Performed by: NURSE PRACTITIONER

## 2022-08-02 RX ORDER — CYCLOBENZAPRINE HCL 10 MG
10 TABLET ORAL 2 TIMES DAILY PRN
Qty: 60 TABLET | Refills: 5 | Status: SHIPPED | OUTPATIENT
Start: 2022-08-02

## 2022-08-02 RX ORDER — CELECOXIB 100 MG/1
100 CAPSULE ORAL 2 TIMES DAILY
Qty: 60 CAPSULE | Refills: 5 | Status: SHIPPED | OUTPATIENT
Start: 2022-08-02

## 2022-08-02 RX ORDER — POTASSIUM CHLORIDE 20 MEQ/1
20 TABLET, EXTENDED RELEASE ORAL DAILY
Qty: 90 TABLET | Refills: 3 | Status: SHIPPED | OUTPATIENT
Start: 2022-08-02 | End: 2023-09-19
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
## 2022-10-26 ENCOUNTER — PATIENT MESSAGE (OUTPATIENT)
Dept: UROLOGY | Facility: CLINIC | Age: 57
End: 2022-10-26
Payer: COMMERCIAL

## 2022-11-02 ENCOUNTER — HOSPITAL ENCOUNTER (OUTPATIENT)
Dept: RADIOLOGY | Facility: CLINIC | Age: 57
Discharge: HOME | End: 2022-11-02
Attending: NURSE PRACTITIONER
Payer: COMMERCIAL

## 2022-11-02 ENCOUNTER — OFFICE VISIT (OUTPATIENT)
Dept: UROLOGY | Facility: CLINIC | Age: 57
End: 2022-11-02
Payer: COMMERCIAL

## 2022-11-02 VITALS
HEART RATE: 83 BPM | SYSTOLIC BLOOD PRESSURE: 134 MMHG | HEIGHT: 71 IN | BODY MASS INDEX: 19.46 KG/M2 | WEIGHT: 139 LBS | DIASTOLIC BLOOD PRESSURE: 81 MMHG

## 2022-11-02 DIAGNOSIS — R39.198 DIFFICULTY VOIDING: Primary | ICD-10-CM

## 2022-11-02 DIAGNOSIS — R31.29 HEMATURIA, MICROSCOPIC: ICD-10-CM

## 2022-11-02 DIAGNOSIS — N28.9 RENAL LESION: ICD-10-CM

## 2022-11-02 PROCEDURE — 99214 OFFICE O/P EST MOD 30 MIN: CPT | Mod: S$GLB,,, | Performed by: NURSE PRACTITIONER

## 2022-11-02 PROCEDURE — 3079F DIAST BP 80-89 MM HG: CPT | Mod: CPTII,S$GLB,, | Performed by: NURSE PRACTITIONER

## 2022-11-02 PROCEDURE — 74018 XR KUB: ICD-10-PCS | Mod: 26,,, | Performed by: RADIOLOGY

## 2022-11-02 PROCEDURE — 3079F PR MOST RECENT DIASTOLIC BLOOD PRESSURE 80-89 MM HG: ICD-10-PCS | Mod: CPTII,S$GLB,, | Performed by: NURSE PRACTITIONER

## 2022-11-02 PROCEDURE — 1159F PR MEDICATION LIST DOCUMENTED IN MEDICAL RECORD: ICD-10-PCS | Mod: CPTII,S$GLB,, | Performed by: NURSE PRACTITIONER

## 2022-11-02 PROCEDURE — 1159F MED LIST DOCD IN RCRD: CPT | Mod: CPTII,S$GLB,, | Performed by: NURSE PRACTITIONER

## 2022-11-02 PROCEDURE — 74018 XR KUB: ICD-10-PCS | Mod: TC,,, | Performed by: UROLOGY

## 2022-11-02 PROCEDURE — 1160F RVW MEDS BY RX/DR IN RCRD: CPT | Mod: CPTII,S$GLB,, | Performed by: NURSE PRACTITIONER

## 2022-11-02 PROCEDURE — 99214 PR OFFICE/OUTPT VISIT, EST, LEVL IV, 30-39 MIN: ICD-10-PCS | Mod: S$GLB,,, | Performed by: NURSE PRACTITIONER

## 2022-11-02 PROCEDURE — 3008F PR BODY MASS INDEX (BMI) DOCUMENTED: ICD-10-PCS | Mod: CPTII,S$GLB,, | Performed by: NURSE PRACTITIONER

## 2022-11-02 PROCEDURE — 3075F PR MOST RECENT SYSTOLIC BLOOD PRESS GE 130-139MM HG: ICD-10-PCS | Mod: CPTII,S$GLB,, | Performed by: NURSE PRACTITIONER

## 2022-11-02 PROCEDURE — 74018 RADEX ABDOMEN 1 VIEW: CPT | Mod: 26,,, | Performed by: RADIOLOGY

## 2022-11-02 PROCEDURE — 3008F BODY MASS INDEX DOCD: CPT | Mod: CPTII,S$GLB,, | Performed by: NURSE PRACTITIONER

## 2022-11-02 PROCEDURE — 1160F PR REVIEW ALL MEDS BY PRESCRIBER/CLIN PHARMACIST DOCUMENTED: ICD-10-PCS | Mod: CPTII,S$GLB,, | Performed by: NURSE PRACTITIONER

## 2022-11-02 PROCEDURE — 74018 RADEX ABDOMEN 1 VIEW: CPT | Mod: TC,,, | Performed by: UROLOGY

## 2022-11-02 PROCEDURE — 3075F SYST BP GE 130 - 139MM HG: CPT | Mod: CPTII,S$GLB,, | Performed by: NURSE PRACTITIONER

## 2022-11-02 NOTE — PROGRESS NOTES
Subjective:       Patient ID: Jose Acosta is a 57 y.o. male.    Chief Complaint: Follow-up, Urinary Retention, Hematuria, and Flank Pain      HPI: 57-year-old male known service six-month follow-up soon as history of BPH with LUTS on Flomax 0.4 mg daily.  He has not been taking his medicine.  States that he feels that he really did needed.  Although he does report that he is going to the bathroom more often voiding smaller volumes.  He also has a history micro hematuria with a negative workup to include cystoscopy.  His imaging showed an 8 mm lesion of the kidney.  He is due for repeat scan now.  Patient is seen no obvious blood in the urine.  Denies dysuria or incontinence.  No other urologic concerns.  Last PSA DELTA was completed by his PCP in December of 2021.  His PSA was 1.4       Past Medical History:   Past Medical History:   Diagnosis Date    Allergy     BPH (benign prostatic hyperplasia)     Chronic constipation     Rheumatoid arthritis involving multiple sites        Past Surgical Historical: History reviewed. No pertinent surgical history.     Medications:   Medication List with Changes/Refills   Current Medications    CELECOXIB (CELEBREX) 100 MG CAPSULE    Take 1 capsule (100 mg total) by mouth 2 (two) times daily.    CYCLOBENZAPRINE (FLEXERIL) 10 MG TABLET    Take 1 tablet (10 mg total) by mouth 2 (two) times daily as needed for Muscle spasms.    LEVOCETIRIZINE (XYZAL) 5 MG TABLET    levocetirizine 5 mg tablet   TAKE 1 TABLET BY MOUTH EVERY DAY IN THE EVENING    POTASSIUM CHLORIDE SA (K-DUR,KLOR-CON) 20 MEQ TABLET    Take 1 tablet (20 mEq total) by mouth once daily. potassium chloride ER 20 mEq tablet,extended release(part/cryst)    TAMSULOSIN (FLOMAX) 0.4 MG CAP    Take 1 capsule (0.4 mg total) by mouth once daily.        Past Social History:   Social History     Socioeconomic History    Marital status:    Occupational History    Occupation:    Tobacco Use    Smoking status: Never     Smokeless tobacco: Never   Substance and Sexual Activity    Alcohol use: Not Currently     Comment: rarely    Drug use: Not Currently       Allergies:   Review of patient's allergies indicates:   Allergen Reactions    Codeine         Family History:   Family History   Problem Relation Age of Onset    Cancer Father     No Known Problems Mother         Review of Systems:  Review of Systems   Constitutional:  Negative for activity change and appetite change.   HENT:  Negative for congestion and dental problem.    Eyes:  Negative for visual disturbance.   Respiratory:  Negative for chest tightness and shortness of breath.    Cardiovascular:  Negative for chest pain.   Gastrointestinal:  Negative for abdominal distention and abdominal pain.   Genitourinary:  Negative for decreased urine volume, difficulty urinating, dysuria, enuresis, flank pain, frequency, genital sores, hematuria, penile discharge, penile pain, penile swelling, scrotal swelling, testicular pain and urgency.   Musculoskeletal:  Negative for back pain and neck pain.   Skin:  Negative for color change.   Neurological:  Negative for dizziness.   Hematological:  Negative for adenopathy.   Psychiatric/Behavioral:  Negative for agitation, behavioral problems and confusion.      Physical Exam:  Physical Exam  Constitutional:       Appearance: He is well-developed.   HENT:      Head: Normocephalic.   Eyes:      General: No scleral icterus.  Pulmonary:      Effort: Pulmonary effort is normal.      Breath sounds: Normal breath sounds.   Abdominal:      General: There is no distension.      Palpations: Abdomen is soft.      Tenderness: There is no abdominal tenderness.      Hernia: No hernia is present. There is no hernia in the right inguinal area or left inguinal area.   Genitourinary:     Penis: Normal.       Testes: Normal. Cremasteric reflex is present.   Musculoskeletal:      Cervical back: Normal range of motion.   Skin:     General: Skin is warm and  dry.   Neurological:      Mental Status: He is alert and oriented to person, place, and time.       Assessment/Plan:   BPH with LUTS--patient's of 1st postvoid residual was 326 mL.  Second attempt 216 mL.  We had a long discussion about resuming his medication or workup for a possible TURP.  Patient would like to resume his medication he has adequate on hand.    Asymptomatic microscopic hematuria urinalysis today RBC 3-5 otherwise negative.  Will check urine cytology.    8 mm renal lesion identified on CT scan his previous workup for microscopic hematuria--patient due repeat CT scan this has been ordered  Problem List Items Addressed This Visit    None  Visit Diagnoses       Difficulty voiding    -  Primary    Relevant Orders    POCT Urinalysis (w/Micro Option)    POCT Bladder Scan

## 2022-11-10 ENCOUNTER — TELEPHONE (OUTPATIENT)
Dept: UROLOGY | Facility: CLINIC | Age: 57
End: 2022-11-10
Payer: COMMERCIAL

## 2022-12-02 ENCOUNTER — PATIENT MESSAGE (OUTPATIENT)
Dept: UROLOGY | Facility: CLINIC | Age: 57
End: 2022-12-02
Payer: COMMERCIAL

## 2022-12-02 LAB
CREAT SERPL-MCNC: 1.14 MG/DL (ref 0.7–1.3)
GFR ESTIMATION: > 60

## 2022-12-13 DIAGNOSIS — R39.198 DIFFICULTY VOIDING: Primary | ICD-10-CM

## 2022-12-13 DIAGNOSIS — R39.198 DIFFICULTY VOIDING: ICD-10-CM

## 2022-12-13 RX ORDER — TAMSULOSIN HYDROCHLORIDE 0.4 MG/1
0.4 CAPSULE ORAL DAILY
Qty: 30 CAPSULE | Refills: 11 | Status: SHIPPED | OUTPATIENT
Start: 2022-12-13 | End: 2024-03-05

## 2022-12-13 RX ORDER — TAMSULOSIN HYDROCHLORIDE 0.4 MG/1
0.4 CAPSULE ORAL DAILY
Qty: 90 CAPSULE | Refills: 3 | Status: SHIPPED | OUTPATIENT
Start: 2022-12-13 | End: 2022-12-13 | Stop reason: SDUPTHER

## 2022-12-14 ENCOUNTER — PATIENT MESSAGE (OUTPATIENT)
Dept: UROLOGY | Facility: CLINIC | Age: 57
End: 2022-12-14

## 2022-12-14 ENCOUNTER — OFFICE VISIT (OUTPATIENT)
Dept: UROLOGY | Facility: CLINIC | Age: 57
End: 2022-12-14
Payer: COMMERCIAL

## 2022-12-14 VITALS — BODY MASS INDEX: 19.46 KG/M2 | HEIGHT: 71 IN | WEIGHT: 139 LBS | RESPIRATION RATE: 18 BRPM

## 2022-12-14 DIAGNOSIS — R39.198 DIFFICULTY VOIDING: Primary | ICD-10-CM

## 2022-12-14 LAB — POC RESIDUAL URINE VOLUME: 74 ML (ref 0–100)

## 2022-12-14 PROCEDURE — 1160F RVW MEDS BY RX/DR IN RCRD: CPT | Mod: CPTII,S$GLB,, | Performed by: NURSE PRACTITIONER

## 2022-12-14 PROCEDURE — 3008F PR BODY MASS INDEX (BMI) DOCUMENTED: ICD-10-PCS | Mod: CPTII,S$GLB,, | Performed by: NURSE PRACTITIONER

## 2022-12-14 PROCEDURE — 1160F PR REVIEW ALL MEDS BY PRESCRIBER/CLIN PHARMACIST DOCUMENTED: ICD-10-PCS | Mod: CPTII,S$GLB,, | Performed by: NURSE PRACTITIONER

## 2022-12-14 PROCEDURE — 1159F PR MEDICATION LIST DOCUMENTED IN MEDICAL RECORD: ICD-10-PCS | Mod: CPTII,S$GLB,, | Performed by: NURSE PRACTITIONER

## 2022-12-14 PROCEDURE — 51798 POCT BLADDER SCAN: ICD-10-PCS | Mod: S$GLB,,, | Performed by: NURSE PRACTITIONER

## 2022-12-14 PROCEDURE — 3008F BODY MASS INDEX DOCD: CPT | Mod: CPTII,S$GLB,, | Performed by: NURSE PRACTITIONER

## 2022-12-14 PROCEDURE — 99214 OFFICE O/P EST MOD 30 MIN: CPT | Mod: S$GLB,,, | Performed by: NURSE PRACTITIONER

## 2022-12-14 PROCEDURE — 51798 US URINE CAPACITY MEASURE: CPT | Mod: S$GLB,,, | Performed by: NURSE PRACTITIONER

## 2022-12-14 PROCEDURE — 1159F MED LIST DOCD IN RCRD: CPT | Mod: CPTII,S$GLB,, | Performed by: NURSE PRACTITIONER

## 2022-12-14 PROCEDURE — 99214 PR OFFICE/OUTPT VISIT, EST, LEVL IV, 30-39 MIN: ICD-10-PCS | Mod: S$GLB,,, | Performed by: NURSE PRACTITIONER

## 2022-12-14 NOTE — PROGRESS NOTES
Subjective:       Patient ID: Jose Acosta is a 57 y.o. male.    Chief Complaint: Difficulty Voiding      HPI: 57-year-old male known to service seen today as a work-in complaints he is in think he was voiding completely.  Last evaluation he had high postvoid residuals but was not taking his Flomax.  I encouraged him to restart at that time which he did.  His PVR today is significantly improved currently 74 mL.  Patient also has a history of hematuria for which he has been worked up negative cytology never negative cysto he has 2 subcentimeter lesions in the kidney which has been followed by CT scan most recently had a contrast CT scan was showed no change in size of the lesions and no post contrast .  No other urologic concerns today.       Past Medical History:   Past Medical History:   Diagnosis Date    Allergy     BPH (benign prostatic hyperplasia)     Chronic constipation     Rheumatoid arthritis involving multiple sites        Past Surgical Historical: History reviewed. No pertinent surgical history.     Medications:   Medication List with Changes/Refills   Current Medications    CELECOXIB (CELEBREX) 100 MG CAPSULE    Take 1 capsule (100 mg total) by mouth 2 (two) times daily.    CYCLOBENZAPRINE (FLEXERIL) 10 MG TABLET    Take 1 tablet (10 mg total) by mouth 2 (two) times daily as needed for Muscle spasms.    LEVOCETIRIZINE (XYZAL) 5 MG TABLET    levocetirizine 5 mg tablet   TAKE 1 TABLET BY MOUTH EVERY DAY IN THE EVENING    POTASSIUM CHLORIDE SA (K-DUR,KLOR-CON) 20 MEQ TABLET    Take 1 tablet (20 mEq total) by mouth once daily. potassium chloride ER 20 mEq tablet,extended release(part/cryst)    TAMSULOSIN (FLOMAX) 0.4 MG CAP    Take 1 capsule (0.4 mg total) by mouth once daily.        Past Social History:   Social History     Socioeconomic History    Marital status:    Occupational History    Occupation:    Tobacco Use    Smoking status: Never    Smokeless tobacco: Never    Substance and Sexual Activity    Alcohol use: Not Currently     Comment: rarely    Drug use: Not Currently       Allergies:   Review of patient's allergies indicates:   Allergen Reactions    Codeine         Family History:   Family History   Problem Relation Age of Onset    Cancer Father     No Known Problems Mother         Review of Systems:  Review of Systems   Constitutional:  Negative for activity change and appetite change.   HENT:  Negative for congestion and dental problem.    Eyes:  Negative for visual disturbance.   Respiratory:  Negative for chest tightness and shortness of breath.    Cardiovascular:  Negative for chest pain.   Gastrointestinal:  Negative for abdominal distention and abdominal pain.   Genitourinary:  Negative for decreased urine volume, difficulty urinating, dysuria, enuresis, flank pain, frequency, genital sores, hematuria, penile discharge, penile pain, penile swelling, scrotal swelling, testicular pain and urgency.   Musculoskeletal:  Negative for back pain and neck pain.   Skin:  Negative for color change.   Neurological:  Negative for dizziness.   Hematological:  Negative for adenopathy.   Psychiatric/Behavioral:  Negative for agitation, behavioral problems and confusion.      Physical Exam:  Physical Exam  Constitutional:       Appearance: He is well-developed.   HENT:      Head: Normocephalic.   Eyes:      General: No scleral icterus.  Pulmonary:      Effort: Pulmonary effort is normal.      Breath sounds: Normal breath sounds.   Abdominal:      General: There is no distension.      Palpations: Abdomen is soft.      Tenderness: There is no abdominal tenderness.      Hernia: No hernia is present. There is no hernia in the right inguinal area or left inguinal area.   Genitourinary:     Penis: Normal.       Testes: Normal. Cremasteric reflex is present.   Musculoskeletal:      Cervical back: Normal range of motion.   Skin:     General: Skin is warm and dry.   Neurological:       Mental Status: He is alert and oriented to person, place, and time.       Assessment/Plan:   BPH with LUTS--doing well on Flomax continue same    Renal lesion--stable on CT with contrast will repeat contrast CT in 6 months    Benign essential micro hematuria--with a negative workup.  RBCs remains stable 6 - 10 per high-power field      Problem List Items Addressed This Visit    None  Visit Diagnoses       Difficulty voiding    -  Primary    Relevant Orders    POCT Urinalysis (w/Micro Option)    POCT Bladder Scan (Completed)

## 2022-12-29 RX ORDER — PROMETHAZINE HYDROCHLORIDE AND DEXTROMETHORPHAN HYDROBROMIDE 6.25; 15 MG/5ML; MG/5ML
5 SYRUP ORAL EVERY 6 HOURS PRN
Qty: 118 ML | Refills: 0 | Status: SHIPPED | OUTPATIENT
Start: 2022-12-29 | End: 2023-02-02

## 2022-12-29 RX ORDER — METHYLPREDNISOLONE 4 MG/1
TABLET ORAL
Qty: 21 EACH | Refills: 0 | Status: SHIPPED | OUTPATIENT
Start: 2022-12-29 | End: 2023-02-02

## 2022-12-29 NOTE — TELEPHONE ENCOUNTER
----- Message from Leatha Reardon sent at 12/29/2022  3:47 PM CST -----  Regarding: Sooner Appointment  Contact: patient  Type:  Sooner Apoointment Request    Caller is requesting a sooner appointment.  Caller declined first available appointment listed below.  Caller will not accept being placed on the waitlist and is requesting a message be sent to doctor.  Name of Caller:Jose   When is the first available appointment? N/A  Symptoms:Strep Throat   Would the patient rather a call back or a response via [facility]?  Call back   Best Call Back Number: [phone] (home)     Additional Information: the patient stated that if he cannot be seen on tomorrow can he get a prescription to be call into the pharmacy.    Thanks,  ANDREW

## 2023-02-02 ENCOUNTER — OFFICE VISIT (OUTPATIENT)
Dept: FAMILY MEDICINE | Facility: CLINIC | Age: 58
End: 2023-02-02
Payer: COMMERCIAL

## 2023-02-02 VITALS
OXYGEN SATURATION: 98 % | TEMPERATURE: 97 F | RESPIRATION RATE: 18 BRPM | DIASTOLIC BLOOD PRESSURE: 69 MMHG | HEART RATE: 82 BPM | BODY MASS INDEX: 19.88 KG/M2 | HEIGHT: 71 IN | WEIGHT: 142 LBS | SYSTOLIC BLOOD PRESSURE: 123 MMHG

## 2023-02-02 DIAGNOSIS — E78.00 PURE HYPERCHOLESTEROLEMIA: Primary | Chronic | ICD-10-CM

## 2023-02-02 DIAGNOSIS — Z23 NEED FOR DIPHTHERIA-TETANUS-PERTUSSIS (TDAP) VACCINE: ICD-10-CM

## 2023-02-02 DIAGNOSIS — R14.1 ABDOMINAL GAS PAIN: ICD-10-CM

## 2023-02-02 DIAGNOSIS — E87.6 HYPOKALEMIA: Chronic | ICD-10-CM

## 2023-02-02 DIAGNOSIS — M50.30 DDD (DEGENERATIVE DISC DISEASE), CERVICAL: Chronic | ICD-10-CM

## 2023-02-02 DIAGNOSIS — K59.09 CHRONIC CONSTIPATION: Chronic | ICD-10-CM

## 2023-02-02 LAB
ABS NRBC COUNT: 0 X 10 3/UL (ref 0–0.01)
ABSOLUTE BASOPHIL: 0.04 X 10 3/UL (ref 0–0.22)
ABSOLUTE EOSINOPHIL: 0.42 X 10 3/UL (ref 0.04–0.54)
ABSOLUTE IMMATURE GRAN: 0.02 X 10 3/UL (ref 0–0.04)
ABSOLUTE LYMPHOCYTE: 1.88 X 10 3/UL (ref 0.86–4.75)
ABSOLUTE MONOCYTE: 0.69 X 10 3/UL (ref 0.22–1.08)
ALBUMIN SERPL-MCNC: 4.4 G/DL (ref 3.5–5.2)
ALBUMIN/GLOB SERPL ELPH: 1.5 {RATIO} (ref 1–2.7)
ALP ISOS SERPL LEV INH-CCNC: 75 U/L (ref 40–130)
ALT (SGPT): 12 U/L (ref 0–41)
ANION GAP SERPL CALC-SCNC: 7 MMOL/L (ref 8–17)
AST SERPL-CCNC: 13 U/L (ref 0–40)
BASOPHILS NFR BLD: 0.6 % (ref 0.2–1.2)
BILIRUBIN, TOTAL: 0.31 MG/DL (ref 0–1.2)
BUN/CREAT SERPL: 14.4 (ref 6–20)
CALCIUM SERPL-MCNC: 9.4 MG/DL (ref 8.6–10.2)
CARBON DIOXIDE, CO2: 34 MMOL/L (ref 22–29)
CHLORIDE: 102 MMOL/L (ref 98–107)
CHOLEST SERPL-MSCNC: 214 MG/DL (ref 100–200)
CREAT SERPL-MCNC: 1.06 MG/DL (ref 0.7–1.2)
EOSINOPHIL NFR BLD: 5.8 % (ref 0.7–7)
GFR ESTIMATION: 82.88
GLOBULIN: 3 G/DL (ref 1.5–4.5)
GLUCOSE: 83 MG/DL (ref 74–106)
HCT VFR BLD AUTO: 48.6 % (ref 42–52)
HDLC SERPL-MCNC: 56 MG/DL
HGB BLD-MCNC: 16.3 G/DL (ref 14–18)
IMMATURE GRANULOCYTES: 0.3 % (ref 0–0.5)
LDL/HDL RATIO: 2.5 (ref 1–3)
LDLC SERPL CALC-MCNC: 140.6 MG/DL (ref 0–100)
LYMPHOCYTES NFR BLD: 26 % (ref 19.3–53.1)
MCH RBC QN AUTO: 29.9 PG (ref 27–32)
MCHC RBC AUTO-ENTMCNC: 33.5 G/DL (ref 32–36)
MCV RBC AUTO: 89.2 FL (ref 80–94)
MONOCYTES NFR BLD: 9.5 % (ref 4.7–12.5)
NEUTROPHILS # BLD AUTO: 4.19 X 10 3/UL (ref 2.15–7.56)
NEUTROPHILS NFR BLD: 57.8 % (ref 34–71.1)
NUCLEATED RED BLOOD CELLS: 0 /100 WBC (ref 0–0.2)
PLATELET # BLD AUTO: 269 X 10 3/UL (ref 135–400)
POTASSIUM: 4.2 MMOL/L (ref 3.5–5.1)
PROT SNV-MCNC: 7.4 G/DL (ref 6.4–8.3)
RBC # BLD AUTO: 5.45 X 10 6/UL (ref 4.7–6.1)
RDW-SD: 43.2 FL (ref 37–54)
SODIUM: 143 MMOL/L (ref 136–145)
TRIGL SERPL-MCNC: 87 MG/DL (ref 0–150)
TSH W/REFLEX TO FT4: 0.87 UIU/ML (ref 0.27–4.2)
UREA NITROGEN (BUN): 15.3 MG/DL (ref 6–20)
WBC # BLD: 7.24 X 10 3/UL (ref 4.3–10.8)

## 2023-02-02 PROCEDURE — 90471 IMMUNIZATION ADMIN: CPT | Mod: S$GLB,,, | Performed by: NURSE PRACTITIONER

## 2023-02-02 PROCEDURE — 3078F DIAST BP <80 MM HG: CPT | Mod: CPTII,S$GLB,, | Performed by: NURSE PRACTITIONER

## 2023-02-02 PROCEDURE — 3074F PR MOST RECENT SYSTOLIC BLOOD PRESSURE < 130 MM HG: ICD-10-PCS | Mod: CPTII,S$GLB,, | Performed by: NURSE PRACTITIONER

## 2023-02-02 PROCEDURE — 1160F PR REVIEW ALL MEDS BY PRESCRIBER/CLIN PHARMACIST DOCUMENTED: ICD-10-PCS | Mod: CPTII,S$GLB,, | Performed by: NURSE PRACTITIONER

## 2023-02-02 PROCEDURE — 3008F PR BODY MASS INDEX (BMI) DOCUMENTED: ICD-10-PCS | Mod: CPTII,S$GLB,, | Performed by: NURSE PRACTITIONER

## 2023-02-02 PROCEDURE — 1159F PR MEDICATION LIST DOCUMENTED IN MEDICAL RECORD: ICD-10-PCS | Mod: CPTII,S$GLB,, | Performed by: NURSE PRACTITIONER

## 2023-02-02 PROCEDURE — 3008F BODY MASS INDEX DOCD: CPT | Mod: CPTII,S$GLB,, | Performed by: NURSE PRACTITIONER

## 2023-02-02 PROCEDURE — 90715 TDAP VACCINE 7 YRS/> IM: CPT | Mod: S$GLB,,, | Performed by: NURSE PRACTITIONER

## 2023-02-02 PROCEDURE — 90715 TDAP VACCINE GREATER THAN OR EQUAL TO 7YO IM: ICD-10-PCS | Mod: S$GLB,,, | Performed by: NURSE PRACTITIONER

## 2023-02-02 PROCEDURE — 99214 OFFICE O/P EST MOD 30 MIN: CPT | Mod: 25,S$GLB,, | Performed by: NURSE PRACTITIONER

## 2023-02-02 PROCEDURE — 99214 PR OFFICE/OUTPT VISIT, EST, LEVL IV, 30-39 MIN: ICD-10-PCS | Mod: 25,S$GLB,, | Performed by: NURSE PRACTITIONER

## 2023-02-02 PROCEDURE — 3078F PR MOST RECENT DIASTOLIC BLOOD PRESSURE < 80 MM HG: ICD-10-PCS | Mod: CPTII,S$GLB,, | Performed by: NURSE PRACTITIONER

## 2023-02-02 PROCEDURE — 90471 TDAP VACCINE GREATER THAN OR EQUAL TO 7YO IM: ICD-10-PCS | Mod: S$GLB,,, | Performed by: NURSE PRACTITIONER

## 2023-02-02 PROCEDURE — 1159F MED LIST DOCD IN RCRD: CPT | Mod: CPTII,S$GLB,, | Performed by: NURSE PRACTITIONER

## 2023-02-02 PROCEDURE — 3074F SYST BP LT 130 MM HG: CPT | Mod: CPTII,S$GLB,, | Performed by: NURSE PRACTITIONER

## 2023-02-02 PROCEDURE — 1160F RVW MEDS BY RX/DR IN RCRD: CPT | Mod: CPTII,S$GLB,, | Performed by: NURSE PRACTITIONER

## 2023-02-02 NOTE — PROGRESS NOTES
Subjective:       Patient ID: Jose Acosta is a 57 y.o. male.    Chief Complaint: Follow-up (Pt is here for a routine 6 month follow up. Pt states he is having some gas pains.)    Here for 6 mo f/u. He has PMH hyperlipidemia, hypokalemia, BPH, chronic constipation, arthritis, DDD cervical spine.       Monitoring mild hyperlipidemia. Labs Jan 2022 revealed total cholesterol 221 and . He has cut back on red meat.      History hypokalemia. Compliant with replacement. Total potassium 4.4 mmol/L Jan 2022. Obtaining CMP today.     More diffuse gas pains of late. He's had chronic constipation since childhood. He is on daily fiber supplement [employer] Herbal Fiberblend and daily probiotic. His last Cologuard 2/26/21 negative.     History of arthritis and DDD cervical spine. He take Celebrex intermittently. He does not take it daily. He uses OTC topical with capsaicin routinely.       Review of Systems   Constitutional:  Negative for chills, fatigue and fever.   Respiratory:  Negative for cough, shortness of breath and wheezing.    Cardiovascular:  Negative for chest pain and palpitations.   Gastrointestinal:  Positive for abdominal pain and constipation. Negative for nausea and vomiting.   Musculoskeletal:  Negative for joint swelling and myalgias.   Neurological:  Negative for dizziness, weakness, light-headedness and headaches.         Past Medical History:  Past Medical History:   Diagnosis Date    Allergy     BPH (benign prostatic hyperplasia)     Chronic constipation     Rheumatoid arthritis involving multiple sites       History reviewed. No pertinent surgical history.   Review of patient's allergies indicates:   Allergen Reactions    Codeine       Current Outpatient Medications   Medication Sig Dispense Refill    celecoxib (CELEBREX) 100 MG capsule Take 1 capsule (100 mg total) by mouth 2 (two) times daily. 60 capsule 5    cyclobenzaprine (FLEXERIL) 10 MG tablet Take 1 tablet (10 mg total) by mouth 2 (two) times  daily as needed for Muscle spasms. 60 tablet 5    levocetirizine (XYZAL) 5 MG tablet levocetirizine 5 mg tablet   TAKE 1 TABLET BY MOUTH EVERY DAY IN THE EVENING      potassium chloride SA (K-DUR,KLOR-CON) 20 MEQ tablet Take 1 tablet (20 mEq total) by mouth once daily. potassium chloride ER 20 mEq tablet,extended release(part/cryst) 90 tablet 3    tamsulosin (FLOMAX) 0.4 mg Cap Take 1 capsule (0.4 mg total) by mouth once daily. 30 capsule 11     No current facility-administered medications for this visit.     Social History     Socioeconomic History    Marital status:    Occupational History    Occupation:    Tobacco Use    Smoking status: Never    Smokeless tobacco: Never   Substance and Sexual Activity    Alcohol use: Not Currently     Comment: rarely    Drug use: Not Currently      Family History   Problem Relation Age of Onset    Cancer Father     No Known Problems Mother         Objective:      Physical Exam  Constitutional:       Appearance: He is well-developed.   HENT:      Head: Normocephalic and atraumatic.      Mouth/Throat:      Mouth: Mucous membranes are moist.      Pharynx: Oropharynx is clear.   Eyes:      General: No scleral icterus.     Conjunctiva/sclera: Conjunctivae normal.   Neck:      Trachea: Trachea normal.   Cardiovascular:      Rate and Rhythm: Normal rate and regular rhythm.   Pulmonary:      Effort: Pulmonary effort is normal.      Breath sounds: Normal breath sounds.   Abdominal:      General: Bowel sounds are normal.      Palpations: Abdomen is soft.      Tenderness: There is no abdominal tenderness.   Musculoskeletal:         General: Normal range of motion.      Cervical back: Normal range of motion and neck supple.   Neurological:      Mental Status: He is alert and oriented to person, place, and time.   Psychiatric:         Mood and Affect: Mood normal.         Speech: Speech normal.         Behavior: Behavior normal.       Assessment:     1. Pure  hypercholesterolemia Active   2. Hypokalemia Active   3. DDD (degenerative disc disease), cervical Stable   4. Abdominal gas pain Active   5. Chronic constipation Stable   6. Need for diphtheria-tetanus-pertussis (Tdap) vaccine      Plan:       PROBLEM LIST     Pure hypercholesterolemia  Comments:  he has reduced red meat intake in diet; obtaining lipid panel now (non-fasting); will notify him w/ results; continue limiting red meat in diet  Orders:  -     CBC Auto Differential  -     Comprehensive Metabolic Panel  -     Lipid Panel  -     TSH w/reflex to FT4    Hypokalemia  Comments:  obtaining CMP today  Orders:  -     Comprehensive Metabolic Panel    DDD (degenerative disc disease), cervical  Comments:  controlled, uses Celebrex sparingly. Continue regular use of topical.     Abdominal gas pain  Comments:  consider using Fiber supplement QOD instead of daily. Last colorectal sceen was Cologuard 2021--neg. Consider colonoscopy next screen.     Chronic constipation  Comments:  current fiber supplement keeps him regular--but causes excess gas; start using QOD    Need for diphtheria-tetanus-pertussis (Tdap) vaccine  -     (In Office Administered) Tdap Vaccine

## 2023-02-09 ENCOUNTER — PATIENT MESSAGE (OUTPATIENT)
Dept: FAMILY MEDICINE | Facility: CLINIC | Age: 58
End: 2023-02-09
Payer: COMMERCIAL

## 2023-04-19 ENCOUNTER — OFFICE VISIT (OUTPATIENT)
Dept: UROLOGY | Facility: CLINIC | Age: 58
End: 2023-04-19
Payer: COMMERCIAL

## 2023-04-19 VITALS — SYSTOLIC BLOOD PRESSURE: 132 MMHG | HEART RATE: 95 BPM | DIASTOLIC BLOOD PRESSURE: 81 MMHG

## 2023-04-19 DIAGNOSIS — N28.9 RENAL LESION: ICD-10-CM

## 2023-04-19 DIAGNOSIS — N40.0 BENIGN PROSTATIC HYPERPLASIA, UNSPECIFIED WHETHER LOWER URINARY TRACT SYMPTOMS PRESENT: ICD-10-CM

## 2023-04-19 DIAGNOSIS — K59.00 CONSTIPATION, UNSPECIFIED CONSTIPATION TYPE: Primary | ICD-10-CM

## 2023-04-19 PROCEDURE — 1160F RVW MEDS BY RX/DR IN RCRD: CPT | Mod: CPTII,S$GLB,, | Performed by: NURSE PRACTITIONER

## 2023-04-19 PROCEDURE — 3079F PR MOST RECENT DIASTOLIC BLOOD PRESSURE 80-89 MM HG: ICD-10-PCS | Mod: CPTII,S$GLB,, | Performed by: NURSE PRACTITIONER

## 2023-04-19 PROCEDURE — 99214 OFFICE O/P EST MOD 30 MIN: CPT | Mod: S$GLB,,, | Performed by: NURSE PRACTITIONER

## 2023-04-19 PROCEDURE — 3079F DIAST BP 80-89 MM HG: CPT | Mod: CPTII,S$GLB,, | Performed by: NURSE PRACTITIONER

## 2023-04-19 PROCEDURE — 3075F PR MOST RECENT SYSTOLIC BLOOD PRESS GE 130-139MM HG: ICD-10-PCS | Mod: CPTII,S$GLB,, | Performed by: NURSE PRACTITIONER

## 2023-04-19 PROCEDURE — 1159F PR MEDICATION LIST DOCUMENTED IN MEDICAL RECORD: ICD-10-PCS | Mod: CPTII,S$GLB,, | Performed by: NURSE PRACTITIONER

## 2023-04-19 PROCEDURE — 1160F PR REVIEW ALL MEDS BY PRESCRIBER/CLIN PHARMACIST DOCUMENTED: ICD-10-PCS | Mod: CPTII,S$GLB,, | Performed by: NURSE PRACTITIONER

## 2023-04-19 PROCEDURE — 3075F SYST BP GE 130 - 139MM HG: CPT | Mod: CPTII,S$GLB,, | Performed by: NURSE PRACTITIONER

## 2023-04-19 PROCEDURE — 99214 PR OFFICE/OUTPT VISIT, EST, LEVL IV, 30-39 MIN: ICD-10-PCS | Mod: S$GLB,,, | Performed by: NURSE PRACTITIONER

## 2023-04-19 PROCEDURE — 1159F MED LIST DOCD IN RCRD: CPT | Mod: CPTII,S$GLB,, | Performed by: NURSE PRACTITIONER

## 2023-04-19 NOTE — PROGRESS NOTES
Subjective:       Patient ID: Jose Acosta is a 57 y.o. male.    Chief Complaint: Urinary Retention      HPI: 7-year-old male known to service history BPH with LUTS on Flomax 0.4 mg daily.  He states last night he felt the need to urinate but was unable to until proximally 12 30 today.  He said he finally opened up and has been voiding well since that time.  He is not sure what happened.  States this has happened to him once before several months back.  He has been medication compliant.  Denies any use of over-the-counter antihistamines.  He has changed his diet to include of lot of seasoning and beets.  Patient also has past history of microscopic hematuria with a negative workup although an incidental 8 mm renal lesion was identified.  He is a previous smoker of 1-2 packs a day for many years.  He quit approximately 20 years ago.  He also has concerns about thickening of the colon wall as identified on a previous CT scan which Radiology read as possible colitis.  He is referred back to his primary care for this.  Patient states that he would like a referral to GI never had a colonoscopy.  He is having issues with intermittent diarrhea versus constipation in need laxatives.       Past Medical History:   Past Medical History:   Diagnosis Date    Allergy     BPH (benign prostatic hyperplasia)     Chronic constipation     Rheumatoid arthritis involving multiple sites        Past Surgical Historical: History reviewed. No pertinent surgical history.     Medications:   Medication List with Changes/Refills   Current Medications    CELECOXIB (CELEBREX) 100 MG CAPSULE    Take 1 capsule (100 mg total) by mouth 2 (two) times daily.    CYCLOBENZAPRINE (FLEXERIL) 10 MG TABLET    Take 1 tablet (10 mg total) by mouth 2 (two) times daily as needed for Muscle spasms.    LEVOCETIRIZINE (XYZAL) 5 MG TABLET    levocetirizine 5 mg tablet   TAKE 1 TABLET BY MOUTH EVERY DAY IN THE EVENING    POTASSIUM CHLORIDE SA (K-DUR,KLOR-CON) 20 MEQ  TABLET    Take 1 tablet (20 mEq total) by mouth once daily. potassium chloride ER 20 mEq tablet,extended release(part/cryst)    TAMSULOSIN (FLOMAX) 0.4 MG CAP    Take 1 capsule (0.4 mg total) by mouth once daily.        Past Social History:   Social History     Socioeconomic History    Marital status:    Occupational History    Occupation:    Tobacco Use    Smoking status: Never    Smokeless tobacco: Never   Substance and Sexual Activity    Alcohol use: Not Currently     Comment: rarely    Drug use: Not Currently       Allergies:   Review of patient's allergies indicates:   Allergen Reactions    Codeine         Family History:   Family History   Problem Relation Age of Onset    Cancer Father     No Known Problems Mother         Review of Systems:  Review of Systems   Constitutional:  Negative for activity change and appetite change.   HENT:  Negative for congestion and dental problem.    Eyes:  Negative for visual disturbance.   Respiratory:  Negative for chest tightness and shortness of breath.    Cardiovascular:  Negative for chest pain.   Gastrointestinal:  Negative for abdominal distention and abdominal pain.   Genitourinary:  Positive for hematuria. Negative for decreased urine volume, difficulty urinating, dysuria, enuresis, flank pain, frequency, genital sores, penile discharge, penile pain, penile swelling, scrotal swelling, testicular pain and urgency.   Musculoskeletal:  Negative for back pain and neck pain.   Skin:  Negative for color change.   Neurological:  Negative for dizziness.   Hematological:  Negative for adenopathy.   Psychiatric/Behavioral:  Negative for agitation, behavioral problems and confusion.      Physical Exam:  Physical Exam  Constitutional:       Appearance: He is well-developed.   HENT:      Head: Normocephalic.   Eyes:      General: No scleral icterus.  Pulmonary:      Effort: Pulmonary effort is normal.      Breath sounds: Normal breath sounds.   Abdominal:       General: There is no distension.      Palpations: Abdomen is soft.      Tenderness: There is no abdominal tenderness.      Hernia: No hernia is present. There is no hernia in the right inguinal area or left inguinal area.   Genitourinary:     Penis: Normal.       Testes: Normal. Cremasteric reflex is present.   Musculoskeletal:      Cervical back: Normal range of motion.   Skin:     General: Skin is warm and dry.   Neurological:      Mental Status: He is alert and oriented to person, place, and time.       Assessment/Plan:   BPH with LUTS--I have asked him to increase his Flomax to 0.8 mg daily.  Notify me if he seen a significant improvement of his urinary stream and if so I will adjust his prescription to reflect the new dose.  If on 0.8 his urinary stream remains at baseline he will back down to 0.4 mg daily.  PSA is drawn today.  Last PSA record 1.4    Microscopic hematuria slight increase in his RBCs to 10-20.  This may be reflective of his short bout of urinary retention.  In light of the 8 mm renal lesion seen on the last CT scan a repeat CT scan for further evaluation and care planning    Colon wall thickening--referred to GI  Problem List Items Addressed This Visit    None

## 2023-04-20 ENCOUNTER — TELEPHONE (OUTPATIENT)
Dept: UROLOGY | Facility: CLINIC | Age: 58
End: 2023-04-20
Payer: COMMERCIAL

## 2023-04-20 LAB — PSA, DIAGNOSTIC: 1 NG/ML (ref 0–4)

## 2023-04-20 NOTE — TELEPHONE ENCOUNTER
----- Message from Paul Villa NP sent at 4/20/2023 12:14 PM CDT -----  Please notify patient of lab results within stable range

## 2023-05-02 ENCOUNTER — OFFICE VISIT (OUTPATIENT)
Dept: FAMILY MEDICINE | Facility: CLINIC | Age: 58
End: 2023-05-02
Payer: COMMERCIAL

## 2023-05-02 VITALS
TEMPERATURE: 98 F | HEART RATE: 83 BPM | HEIGHT: 71 IN | WEIGHT: 141 LBS | OXYGEN SATURATION: 95 % | BODY MASS INDEX: 19.74 KG/M2 | RESPIRATION RATE: 18 BRPM | SYSTOLIC BLOOD PRESSURE: 135 MMHG | DIASTOLIC BLOOD PRESSURE: 88 MMHG

## 2023-05-02 DIAGNOSIS — E78.00 PURE HYPERCHOLESTEROLEMIA: Chronic | ICD-10-CM

## 2023-05-02 DIAGNOSIS — H61.23 BILATERAL IMPACTED CERUMEN: ICD-10-CM

## 2023-05-02 DIAGNOSIS — M21.611 BUNION, RIGHT FOOT: ICD-10-CM

## 2023-05-02 DIAGNOSIS — M79.671 RIGHT FOOT PAIN: ICD-10-CM

## 2023-05-02 DIAGNOSIS — L27.0 DERMATITIS, DRUG-INDUCED: Primary | ICD-10-CM

## 2023-05-02 DIAGNOSIS — E87.6 HYPOKALEMIA: Chronic | ICD-10-CM

## 2023-05-02 PROCEDURE — 3008F BODY MASS INDEX DOCD: CPT | Mod: CPTII,S$GLB,, | Performed by: NURSE PRACTITIONER

## 2023-05-02 PROCEDURE — 3075F PR MOST RECENT SYSTOLIC BLOOD PRESS GE 130-139MM HG: ICD-10-PCS | Mod: CPTII,S$GLB,, | Performed by: NURSE PRACTITIONER

## 2023-05-02 PROCEDURE — 3079F DIAST BP 80-89 MM HG: CPT | Mod: CPTII,S$GLB,, | Performed by: NURSE PRACTITIONER

## 2023-05-02 PROCEDURE — 99214 OFFICE O/P EST MOD 30 MIN: CPT | Mod: S$GLB,,, | Performed by: NURSE PRACTITIONER

## 2023-05-02 PROCEDURE — 3008F PR BODY MASS INDEX (BMI) DOCUMENTED: ICD-10-PCS | Mod: CPTII,S$GLB,, | Performed by: NURSE PRACTITIONER

## 2023-05-02 PROCEDURE — 1159F MED LIST DOCD IN RCRD: CPT | Mod: CPTII,S$GLB,, | Performed by: NURSE PRACTITIONER

## 2023-05-02 PROCEDURE — 1159F PR MEDICATION LIST DOCUMENTED IN MEDICAL RECORD: ICD-10-PCS | Mod: CPTII,S$GLB,, | Performed by: NURSE PRACTITIONER

## 2023-05-02 PROCEDURE — 99214 PR OFFICE/OUTPT VISIT, EST, LEVL IV, 30-39 MIN: ICD-10-PCS | Mod: S$GLB,,, | Performed by: NURSE PRACTITIONER

## 2023-05-02 PROCEDURE — 3079F PR MOST RECENT DIASTOLIC BLOOD PRESSURE 80-89 MM HG: ICD-10-PCS | Mod: CPTII,S$GLB,, | Performed by: NURSE PRACTITIONER

## 2023-05-02 PROCEDURE — 3075F SYST BP GE 130 - 139MM HG: CPT | Mod: CPTII,S$GLB,, | Performed by: NURSE PRACTITIONER

## 2023-05-02 RX ORDER — BETAMETHASONE VALERATE 1.2 MG/G
CREAM TOPICAL 2 TIMES DAILY
Qty: 45 G | Refills: 0 | Status: SHIPPED | OUTPATIENT
Start: 2023-05-02

## 2023-05-02 NOTE — PROGRESS NOTES
Subjective:       Patient ID: Jose Acosta is a 57 y.o. male.    Chief Complaint: Follow-up (Pt is here for a lab review. Pt also has a rash on his left arm that he is concerned about. Pt also states his big toe on his right foot has a knot that is swollen..requesting a referral to podiatry.)    He has PMH hyperlipidemia, hypokalemia, BPH, chronic constipation, arthritis, DDD cervical spine.        History hypokalemia. Compliant with replacement. Total potassium 4.2 mmol/L Feb 2023.       Monitoring mild hyperlipidemia. Labs Feb 2023 revealed total cholesterol 214  and . He has cut back on red meat.       Hyperlipidemia    Type of hyperlipidemia: combined  Duration: chronic  Control: usually well controlled; at goal  Compliance: compliant; compliant with diet; exercises  Complications: no coronary artery disease; no cerebral vascular disease, no peripheral artery disease  ASCVD:The 10-year ASCVD risk score (Lyudmila GUIDO, et al., 2019) is: 6%    Values used to calculate the score:      Age: 57 years      Sex: Male      Is Non- : No      Diabetic: No      Tobacco smoker: No      Systolic Blood Pressure: 135 mmHg      Is BP treated: No      HDL Cholesterol: 56 mg/dL      Total Cholesterol: 214 mg/dL         He c/o pain around Lt ear w/ reactive lymph nodes. Wax impaction Lt ear visualized, partial Rt impaction noted.     Right foot pain secondary to bunion; pain present 5 yr, progressively getting worse    He developed rash to arms, not too itchy; erupted while taking bactrim to treat acute sinusitis.       Review of Systems   Constitutional:  Negative for chills, fatigue and fever.   HENT:  Positive for ear pain. Negative for ear discharge.    Respiratory:  Negative for cough, shortness of breath and wheezing.    Cardiovascular:  Negative for chest pain and palpitations.   Gastrointestinal:  Negative for abdominal pain and constipation.   Musculoskeletal:  Positive for gait problem and  joint swelling. Negative for myalgias.   Skin:  Positive for rash.   Neurological:  Negative for dizziness, light-headedness and headaches.   Hematological:  Positive for adenopathy (Rt ear).         Past Medical History:  Past Medical History:   Diagnosis Date    Allergy     BPH (benign prostatic hyperplasia)     Chronic constipation     Rheumatoid arthritis involving multiple sites       History reviewed. No pertinent surgical history.   Review of patient's allergies indicates:   Allergen Reactions    Codeine       Current Outpatient Medications   Medication Sig Dispense Refill    celecoxib (CELEBREX) 100 MG capsule Take 1 capsule (100 mg total) by mouth 2 (two) times daily. 60 capsule 5    cyclobenzaprine (FLEXERIL) 10 MG tablet Take 1 tablet (10 mg total) by mouth 2 (two) times daily as needed for Muscle spasms. 60 tablet 5    levocetirizine (XYZAL) 5 MG tablet levocetirizine 5 mg tablet   TAKE 1 TABLET BY MOUTH EVERY DAY IN THE EVENING      potassium chloride SA (K-DUR,KLOR-CON) 20 MEQ tablet Take 1 tablet (20 mEq total) by mouth once daily. potassium chloride ER 20 mEq tablet,extended release(part/cryst) 90 tablet 3    tamsulosin (FLOMAX) 0.4 mg Cap Take 1 capsule (0.4 mg total) by mouth once daily. 30 capsule 11    betamethasone valerate 0.1% (VALISONE) 0.1 % Crea Apply topically 2 (two) times daily. 45 g 0     No current facility-administered medications for this visit.     Social History     Socioeconomic History    Marital status:    Occupational History    Occupation:    Tobacco Use    Smoking status: Never    Smokeless tobacco: Never   Substance and Sexual Activity    Alcohol use: Not Currently     Comment: rarely    Drug use: Not Currently      Family History   Problem Relation Age of Onset    Cancer Father     No Known Problems Mother         Objective:      Physical Exam  Constitutional:       Appearance: He is well-developed.   HENT:      Head: Normocephalic and atraumatic.       Right Ear: There is impacted cerumen (complete).      Left Ear: There is impacted cerumen (rash).      Mouth/Throat:      Mouth: Mucous membranes are moist.      Pharynx: Oropharynx is clear.   Eyes:      General: No scleral icterus.     Conjunctiva/sclera: Conjunctivae normal.   Neck:      Trachea: Trachea normal.   Cardiovascular:      Rate and Rhythm: Normal rate and regular rhythm.   Pulmonary:      Effort: Pulmonary effort is normal.      Breath sounds: Normal breath sounds.   Musculoskeletal:         General: Tenderness and deformity (bunion Rt foot) present. Normal range of motion.      Cervical back: Normal range of motion and neck supple.   Neurological:      Mental Status: He is alert and oriented to person, place, and time.   Psychiatric:         Mood and Affect: Mood normal.         Speech: Speech normal.         Behavior: Behavior normal.       Assessment:     1. Dermatitis, drug-induced Acute   2. Bunion, right foot    3. Right foot pain    4. Pure hypercholesterolemia Active   5. Hypokalemia Stable   6. Bilateral impacted cerumen Active     Plan:       PROBLEM LIST     Dermatitis, drug-induced  Comments:  Rash upper extremities; possible rx to Bactrim; Apply betamethason combined with Aquaphor BID  Orders:  -     betamethasone valerate 0.1% (VALISONE) 0.1 % Crea; Apply topically 2 (two) times daily.  Dispense: 45 g; Refill: 0    Bunion, right foot  Comments:  arranging podiatry eval   Orders:  -     Ambulatory referral/consult to Podiatry; Future; Expected date: 05/09/2023    Right foot pain  -     Ambulatory referral/consult to Podiatry; Future; Expected date: 05/09/2023    Pure hypercholesterolemia  Comments:  reviewed ASCVD score; recommend daily Omega-3 supplement; continue to limit red meat in diet; avoid processed foods if possible    Hypokalemia  Comments:  potassium remains in normal range; monitor    Bilateral impacted cerumen  Comments:  complete cerumen impaction Rt ear; partial impaction  Lt; prep ear with Debrox x 1 week; RTC in 1 week for ear irrigation

## 2023-05-09 ENCOUNTER — OFFICE VISIT (OUTPATIENT)
Dept: FAMILY MEDICINE | Facility: CLINIC | Age: 58
End: 2023-05-09
Payer: COMMERCIAL

## 2023-05-09 VITALS
HEIGHT: 71 IN | TEMPERATURE: 98 F | DIASTOLIC BLOOD PRESSURE: 76 MMHG | WEIGHT: 142 LBS | BODY MASS INDEX: 19.88 KG/M2 | OXYGEN SATURATION: 97 % | SYSTOLIC BLOOD PRESSURE: 120 MMHG | RESPIRATION RATE: 18 BRPM | HEART RATE: 82 BPM

## 2023-05-09 DIAGNOSIS — H61.21 IMPACTED CERUMEN OF RIGHT EAR: Primary | ICD-10-CM

## 2023-05-09 PROCEDURE — 1159F MED LIST DOCD IN RCRD: CPT | Mod: CPTII,S$GLB,, | Performed by: NURSE PRACTITIONER

## 2023-05-09 PROCEDURE — 99211 OFF/OP EST MAY X REQ PHY/QHP: CPT | Mod: S$GLB,,, | Performed by: NURSE PRACTITIONER

## 2023-05-09 PROCEDURE — 3008F BODY MASS INDEX DOCD: CPT | Mod: CPTII,S$GLB,, | Performed by: NURSE PRACTITIONER

## 2023-05-09 PROCEDURE — 1159F PR MEDICATION LIST DOCUMENTED IN MEDICAL RECORD: ICD-10-PCS | Mod: CPTII,S$GLB,, | Performed by: NURSE PRACTITIONER

## 2023-05-09 PROCEDURE — 99211 PR OFFICE/OUTPT VISIT, EST, LEVL I: ICD-10-PCS | Mod: S$GLB,,, | Performed by: NURSE PRACTITIONER

## 2023-05-09 PROCEDURE — 3078F DIAST BP <80 MM HG: CPT | Mod: CPTII,S$GLB,, | Performed by: NURSE PRACTITIONER

## 2023-05-09 PROCEDURE — 3074F SYST BP LT 130 MM HG: CPT | Mod: CPTII,S$GLB,, | Performed by: NURSE PRACTITIONER

## 2023-05-09 PROCEDURE — 3078F PR MOST RECENT DIASTOLIC BLOOD PRESSURE < 80 MM HG: ICD-10-PCS | Mod: CPTII,S$GLB,, | Performed by: NURSE PRACTITIONER

## 2023-05-09 PROCEDURE — 3074F PR MOST RECENT SYSTOLIC BLOOD PRESSURE < 130 MM HG: ICD-10-PCS | Mod: CPTII,S$GLB,, | Performed by: NURSE PRACTITIONER

## 2023-05-09 PROCEDURE — 3008F PR BODY MASS INDEX (BMI) DOCUMENTED: ICD-10-PCS | Mod: CPTII,S$GLB,, | Performed by: NURSE PRACTITIONER

## 2023-05-09 NOTE — PROGRESS NOTES
Subjective:       Patient ID: Jose Acosta is a 57 y.o. male.    Chief Complaint: Follow-up (Pt is here for ear irrigation.)    HPI    He returns for ear irrigation. He had a complete impaction within right ear last week and partial impaction Lt ear. He prepped ears w/ debrox and most of wax came out at home. He has retained a very small amt wax in is right ear. Rt Ear completely cleared out w/ minimal irrigation.      Review of Systems   HENT:  Positive for tinnitus (chronic). Negative for ear discharge and ear pain.    Hematological:  Negative for adenopathy.         Past Medical History:  Past Medical History:   Diagnosis Date    Allergy     BPH (benign prostatic hyperplasia)     Chronic constipation     Rheumatoid arthritis involving multiple sites       History reviewed. No pertinent surgical history.   Review of patient's allergies indicates:   Allergen Reactions    Codeine       Current Outpatient Medications   Medication Sig Dispense Refill    betamethasone valerate 0.1% (VALISONE) 0.1 % Crea Apply topically 2 (two) times daily. 45 g 0    celecoxib (CELEBREX) 100 MG capsule Take 1 capsule (100 mg total) by mouth 2 (two) times daily. 60 capsule 5    cyclobenzaprine (FLEXERIL) 10 MG tablet Take 1 tablet (10 mg total) by mouth 2 (two) times daily as needed for Muscle spasms. 60 tablet 5    levocetirizine (XYZAL) 5 MG tablet levocetirizine 5 mg tablet   TAKE 1 TABLET BY MOUTH EVERY DAY IN THE EVENING      potassium chloride SA (K-DUR,KLOR-CON) 20 MEQ tablet Take 1 tablet (20 mEq total) by mouth once daily. potassium chloride ER 20 mEq tablet,extended release(part/cryst) 90 tablet 3    tamsulosin (FLOMAX) 0.4 mg Cap Take 1 capsule (0.4 mg total) by mouth once daily. 30 capsule 11     No current facility-administered medications for this visit.     Social History     Socioeconomic History    Marital status:    Occupational History    Occupation:    Tobacco Use    Smoking status: Never     Smokeless tobacco: Never   Substance and Sexual Activity    Alcohol use: Not Currently     Comment: rarely    Drug use: Not Currently      Family History   Problem Relation Age of Onset    Cancer Father     No Known Problems Mother         Objective:      Physical Exam  HENT:      Right Ear: Tympanic membrane, ear canal and external ear normal. There is no impacted cerumen.      Left Ear: Tympanic membrane, ear canal and external ear normal. There is no impacted cerumen.       Assessment:     No diagnosis found.  Plan:       PROBLEM LIST     There are no diagnoses linked to this encounter.

## 2023-05-30 ENCOUNTER — OFFICE VISIT (OUTPATIENT)
Dept: FAMILY MEDICINE | Facility: CLINIC | Age: 58
End: 2023-05-30
Payer: COMMERCIAL

## 2023-05-30 VITALS
BODY MASS INDEX: 19.88 KG/M2 | HEIGHT: 71 IN | DIASTOLIC BLOOD PRESSURE: 79 MMHG | OXYGEN SATURATION: 95 % | WEIGHT: 142 LBS | TEMPERATURE: 98 F | RESPIRATION RATE: 18 BRPM | HEART RATE: 87 BPM | SYSTOLIC BLOOD PRESSURE: 124 MMHG

## 2023-05-30 DIAGNOSIS — Z01.818 PREOPERATIVE CLEARANCE: Primary | ICD-10-CM

## 2023-05-30 DIAGNOSIS — M21.611 BUNION OF RIGHT FOOT: ICD-10-CM

## 2023-05-30 PROBLEM — I10 BENIGN ESSENTIAL HYPERTENSION: Status: RESOLVED | Noted: 2019-05-20 | Resolved: 2023-05-30

## 2023-05-30 PROBLEM — R21 RASH: Status: RESOLVED | Noted: 2019-05-20 | Resolved: 2023-05-30

## 2023-05-30 PROBLEM — M17.9 OSTEOARTHRITIS, KNEE: Status: ACTIVE | Noted: 2019-05-20

## 2023-05-30 PROBLEM — I78.0 HEREDITARY HEMORRHAGIC TELANGIECTASIA: Status: RESOLVED | Noted: 2022-08-02 | Resolved: 2023-05-30

## 2023-05-30 LAB
ABS NRBC COUNT: 0 THOU/UL (ref 0–0.01)
ABSOLUTE BASOPHIL: 0 10*3/UL (ref 0–0.3)
ABSOLUTE EOSINOPHIL: 0.2 10*3/UL (ref 0–0.6)
ABSOLUTE IMMATURE GRAN: 0.01 THOU/UL (ref 0–0.03)
ABSOLUTE LYMPHOCYTE: 1.6 10*3/UL (ref 1.2–4)
ABSOLUTE MONOCYTE: 0.5 10*3/UL (ref 0.1–0.8)
ANION GAP SERPL CALC-SCNC: 4 MMOL/L (ref 3–11)
BASOPHILS NFR BLD: 0.5 % (ref 0–3)
BUN SERPL-MCNC: 12 MG/DL (ref 7–18)
BUN/CREAT SERPL: 11.65 RATIO
CALCIUM SERPL-MCNC: 8.8 MG/DL (ref 8.5–10.1)
CHLORIDE SERPL-SCNC: 103 MMOL/L (ref 98–107)
CO2 SERPL-SCNC: 29 MMOL/L (ref 21–32)
CREAT SERPL-MCNC: 1.03 MG/DL (ref 0.7–1.3)
EOSINOPHIL NFR BLD: 2.7 % (ref 0–6)
ERYTHROCYTE [DISTWIDTH] IN BLOOD BY AUTOMATED COUNT: 12.7 % (ref 0–15.5)
GFR ESTIMATION: > 60
GLUCOSE SERPL-MCNC: 105 MG/DL (ref 74–106)
HCT VFR BLD AUTO: 49 % (ref 42–52)
HGB BLD-MCNC: 16.1 G/DL (ref 14–18)
IMMATURE GRANULOCYTES: 0.2 % (ref 0–0.43)
LYMPHOCYTES NFR BLD: 27.6 % (ref 20–45)
MCH RBC QN AUTO: 29.2 PG (ref 27–32)
MCHC RBC AUTO-ENTMCNC: 32.9 % (ref 32–36)
MCV RBC AUTO: 88.9 FL (ref 80–97)
MONOCYTES NFR BLD: 8.9 % (ref 2–10)
NEUTROPHILS # BLD AUTO: 3.5 10*3/UL (ref 1.4–7)
NEUTROPHILS NFR BLD: 60.1 % (ref 50–80)
NUCLEATED RED BLOOD CELLS: 0 % (ref 0–0.2)
PLATELETS: 246 10*3/UL (ref 130–400)
PMV BLD AUTO: 8.2 FL (ref 9.2–12.2)
POTASSIUM SERPL-SCNC: 4.1 MMOL/L (ref 3.5–5.1)
RBC # BLD AUTO: 5.51 10*6/UL (ref 4.7–6.1)
SODIUM BLD-SCNC: 136 MMOL/L (ref 131–143)
WBC # BLD: 5.8 10*3/UL (ref 4.5–10)

## 2023-05-30 PROCEDURE — 3008F PR BODY MASS INDEX (BMI) DOCUMENTED: ICD-10-PCS | Mod: CPTII,S$GLB,, | Performed by: NURSE PRACTITIONER

## 2023-05-30 PROCEDURE — 3074F SYST BP LT 130 MM HG: CPT | Mod: CPTII,S$GLB,, | Performed by: NURSE PRACTITIONER

## 2023-05-30 PROCEDURE — 3078F PR MOST RECENT DIASTOLIC BLOOD PRESSURE < 80 MM HG: ICD-10-PCS | Mod: CPTII,S$GLB,, | Performed by: NURSE PRACTITIONER

## 2023-05-30 PROCEDURE — 3008F BODY MASS INDEX DOCD: CPT | Mod: CPTII,S$GLB,, | Performed by: NURSE PRACTITIONER

## 2023-05-30 PROCEDURE — 3074F PR MOST RECENT SYSTOLIC BLOOD PRESSURE < 130 MM HG: ICD-10-PCS | Mod: CPTII,S$GLB,, | Performed by: NURSE PRACTITIONER

## 2023-05-30 PROCEDURE — 99213 PR OFFICE/OUTPT VISIT, EST, LEVL III, 20-29 MIN: ICD-10-PCS | Mod: S$GLB,,, | Performed by: NURSE PRACTITIONER

## 2023-05-30 PROCEDURE — 1159F MED LIST DOCD IN RCRD: CPT | Mod: CPTII,S$GLB,, | Performed by: NURSE PRACTITIONER

## 2023-05-30 PROCEDURE — 1159F PR MEDICATION LIST DOCUMENTED IN MEDICAL RECORD: ICD-10-PCS | Mod: CPTII,S$GLB,, | Performed by: NURSE PRACTITIONER

## 2023-05-30 PROCEDURE — 3078F DIAST BP <80 MM HG: CPT | Mod: CPTII,S$GLB,, | Performed by: NURSE PRACTITIONER

## 2023-05-30 PROCEDURE — 99213 OFFICE O/P EST LOW 20 MIN: CPT | Mod: S$GLB,,, | Performed by: NURSE PRACTITIONER

## 2023-05-30 NOTE — PROGRESS NOTES
Subjective:       Patient ID: Jose Acosta is a 57 y.o. male.    Chief Complaint: Pre-op Exam (Pt is here for a surgery clearance.)    Patient is here for preoperative clearance. Tentatively, he is scheduled for Rt bunionectomy w/ Dr Bazzi on Monday 6/8/23.  He currently denies any recent illness. No complaints of dizziness, malaise, or headache. No CP, palpitations, or SOB. No cough, wheeze, upper respiratory, or lower respiratory symptoms. No swelling, no bruising or rashes. No fever/N/V/D/abd pain.    Review of Systems   Constitutional:  Negative for chills, fatigue and fever.   Respiratory:  Negative for cough, shortness of breath and wheezing.    Cardiovascular:  Negative for chest pain and palpitations.   Gastrointestinal:  Negative for abdominal pain, nausea and vomiting.   Musculoskeletal:  Positive for gait problem and joint swelling. Negative for myalgias.   Skin:  Negative for rash.   Neurological:  Negative for dizziness, light-headedness and headaches.         Past Medical History:  Past Medical History:   Diagnosis Date    Allergy     BPH (benign prostatic hyperplasia)     Chronic constipation     Rheumatoid arthritis involving multiple sites       History reviewed. No pertinent surgical history.   Review of patient's allergies indicates:   Allergen Reactions    Codeine       Current Outpatient Medications   Medication Sig Dispense Refill    betamethasone valerate 0.1% (VALISONE) 0.1 % Crea Apply topically 2 (two) times daily. 45 g 0    celecoxib (CELEBREX) 100 MG capsule Take 1 capsule (100 mg total) by mouth 2 (two) times daily. 60 capsule 5    cyclobenzaprine (FLEXERIL) 10 MG tablet Take 1 tablet (10 mg total) by mouth 2 (two) times daily as needed for Muscle spasms. 60 tablet 5    levocetirizine (XYZAL) 5 MG tablet levocetirizine 5 mg tablet   TAKE 1 TABLET BY MOUTH EVERY DAY IN THE EVENING      potassium chloride SA (K-DUR,KLOR-CON) 20 MEQ tablet Take 1 tablet (20 mEq total) by mouth once  daily. potassium chloride ER 20 mEq tablet,extended release(part/cryst) 90 tablet 3    tamsulosin (FLOMAX) 0.4 mg Cap Take 1 capsule (0.4 mg total) by mouth once daily. 30 capsule 11     No current facility-administered medications for this visit.     Social History     Socioeconomic History    Marital status:    Occupational History    Occupation:    Tobacco Use    Smoking status: Never    Smokeless tobacco: Never   Substance and Sexual Activity    Alcohol use: Not Currently     Comment: rarely    Drug use: Not Currently      Family History   Problem Relation Age of Onset    Cancer Father     No Known Problems Mother         Objective:      Physical Exam  Constitutional:       Appearance: He is well-developed.   HENT:      Head: Normocephalic and atraumatic.      Mouth/Throat:      Mouth: Mucous membranes are moist.      Pharynx: Oropharynx is clear.   Eyes:      General: No scleral icterus.     Conjunctiva/sclera: Conjunctivae normal.   Neck:      Trachea: Trachea normal.   Cardiovascular:      Rate and Rhythm: Normal rate and regular rhythm.   Pulmonary:      Effort: Pulmonary effort is normal.      Breath sounds: Normal breath sounds.   Abdominal:      General: Bowel sounds are normal.      Palpations: Abdomen is soft.      Tenderness: There is no abdominal tenderness.   Musculoskeletal:         General: Deformity (bunion Rt foot) present.      Cervical back: Normal range of motion and neck supple.   Neurological:      Mental Status: He is alert and oriented to person, place, and time.   Psychiatric:         Mood and Affect: Mood normal.         Speech: Speech normal.         Behavior: Behavior normal.       Assessment:     1. Preoperative clearance    2. Bunion of right foot      Plan:       PROBLEM LIST     Preoperative clearance  -     CBC Auto Differential; Future; Expected date: 05/30/2023  -     Basic metabolic panel; Future; Expected date: 05/30/2023  -     EKG 12-lead;  Future    Bunion of right foot      Reviewed labs/ EKG. No acute illnesses or acute cardiopulmonary issues that would interfere w/ her anesthesia. VSS, hemodynamically stable. UTD with Tdap, flu, and initial covid vaccine series. W  Cleared for surgery.

## 2023-06-06 ENCOUNTER — OFFICE VISIT (OUTPATIENT)
Dept: GASTROENTEROLOGY | Facility: CLINIC | Age: 58
End: 2023-06-06
Payer: COMMERCIAL

## 2023-06-06 VITALS
OXYGEN SATURATION: 96 % | HEART RATE: 83 BPM | DIASTOLIC BLOOD PRESSURE: 81 MMHG | WEIGHT: 141 LBS | BODY MASS INDEX: 19.74 KG/M2 | HEIGHT: 71 IN | SYSTOLIC BLOOD PRESSURE: 127 MMHG

## 2023-06-06 DIAGNOSIS — K59.00 CONSTIPATION, UNSPECIFIED CONSTIPATION TYPE: ICD-10-CM

## 2023-06-06 DIAGNOSIS — Z12.11 SCREENING FOR MALIGNANT NEOPLASM OF COLON: ICD-10-CM

## 2023-06-06 DIAGNOSIS — K62.5 RECTAL BLEEDING: Primary | ICD-10-CM

## 2023-06-06 PROCEDURE — 99204 OFFICE O/P NEW MOD 45 MIN: CPT | Mod: S$GLB,,, | Performed by: INTERNAL MEDICINE

## 2023-06-06 PROCEDURE — 3079F PR MOST RECENT DIASTOLIC BLOOD PRESSURE 80-89 MM HG: ICD-10-PCS | Mod: CPTII,S$GLB,, | Performed by: INTERNAL MEDICINE

## 2023-06-06 PROCEDURE — 3079F DIAST BP 80-89 MM HG: CPT | Mod: CPTII,S$GLB,, | Performed by: INTERNAL MEDICINE

## 2023-06-06 PROCEDURE — 1159F PR MEDICATION LIST DOCUMENTED IN MEDICAL RECORD: ICD-10-PCS | Mod: CPTII,S$GLB,, | Performed by: INTERNAL MEDICINE

## 2023-06-06 PROCEDURE — 3074F SYST BP LT 130 MM HG: CPT | Mod: CPTII,S$GLB,, | Performed by: INTERNAL MEDICINE

## 2023-06-06 PROCEDURE — 3008F BODY MASS INDEX DOCD: CPT | Mod: CPTII,S$GLB,, | Performed by: INTERNAL MEDICINE

## 2023-06-06 PROCEDURE — 3008F PR BODY MASS INDEX (BMI) DOCUMENTED: ICD-10-PCS | Mod: CPTII,S$GLB,, | Performed by: INTERNAL MEDICINE

## 2023-06-06 PROCEDURE — 99204 PR OFFICE/OUTPT VISIT, NEW, LEVL IV, 45-59 MIN: ICD-10-PCS | Mod: S$GLB,,, | Performed by: INTERNAL MEDICINE

## 2023-06-06 PROCEDURE — 1159F MED LIST DOCD IN RCRD: CPT | Mod: CPTII,S$GLB,, | Performed by: INTERNAL MEDICINE

## 2023-06-06 PROCEDURE — 3074F PR MOST RECENT SYSTOLIC BLOOD PRESSURE < 130 MM HG: ICD-10-PCS | Mod: CPTII,S$GLB,, | Performed by: INTERNAL MEDICINE

## 2023-06-06 RX ORDER — SOD SULF/POT CHLORIDE/MAG SULF 1.479 G
12 TABLET ORAL DAILY
Qty: 24 TABLET | Refills: 0 | Status: SHIPPED | OUTPATIENT
Start: 2023-06-06 | End: 2023-10-18

## 2023-06-06 NOTE — PROGRESS NOTES
Clinic Note    Reason for visit:  The primary encounter diagnosis was Rectal bleeding. Diagnoses of Constipation, unspecified constipation type and Screening for malignant neoplasm of colon were also pertinent to this visit.    PCP: Bel Bowser   [address] / [place] [address]    HPI:  This is a 57 y.o. male here to be established. Referred for Constipation. He states he has had constipation for all of his life. He states he goes 1-2 weeks without a BM. He takes AIM herbal fiber blend and dulcolax as needed which helps him have a BM about every 3 days. He can't take dulcolax daily due to abdominal cramping. He has tried miralax and fiber supplements which did not help BM. He recently was diagnosed with enlarge prostate. He is having problems urinating only when he is constipated and attributes to stool pushing on his prostate. He does see small amount of BRB on toilet tissue after BM when straining. He has heartburn only with inciting foods. Denies dysphagia, abd pain, unintentional weight loss, F/H CRC.Take celebrex as needed.    5/30/2023 CBC, BMP WNL  2/22023 CMP WNL    12/02/2022 CT A/P w/ w/o:subcentimeter hypoattenuating lesions in both kidneys.No bowel wall thickening.    5/19/2022 CT A/P w/ and w/o: 2 subcentimeter hypodensities in R kidney. Mild long segment wall thickening of distal descending colon and rectosigmoid.    1/31/2022 Cologuard- Negative    Review of Systems   Constitutional:  Negative for diaphoresis, fatigue, fever and unexpected weight change.   HENT:  Negative for hearing loss, mouth sores, postnasal drip, sore throat and trouble swallowing.    Eyes:  Negative for pain, discharge and eye dryness.   Respiratory:  Negative for apnea, cough, choking, chest tightness, shortness of breath and wheezing.    Cardiovascular:  Negative for chest pain, palpitations and leg swelling.   Gastrointestinal:  Positive for constipation. Negative for abdominal distention,  abdominal pain, anal bleeding, blood in stool, change in bowel habit, diarrhea, nausea, rectal pain, vomiting, reflux, fecal incontinence and change in bowel habit.   Genitourinary:  Positive for hematuria. Negative for bladder incontinence and dysuria.   Musculoskeletal:  Negative for arthralgias, back pain and joint swelling.   Integumentary:  Negative for color change and rash.   Allergic/Immunologic: Positive for environmental allergies. Negative for food allergies.   Neurological:  Negative for seizures and headaches.   Hematological:  Negative for adenopathy. Does not bruise/bleed easily.      Past Medical History:   Diagnosis Date    Allergy     BPH (benign prostatic hyperplasia)     Chronic constipation     Hypochloremia     Rheumatoid arthritis involving multiple sites      No past surgical history on file.  Family History   Problem Relation Age of Onset    No Known Problems Mother     Cancer Father      Social History     Tobacco Use    Smoking status: Never    Smokeless tobacco: Never   Substance Use Topics    Alcohol use: Not Currently     Comment: rarely    Drug use: Not Currently     Review of patient's allergies indicates:   Allergen Reactions    Codeine         Medication List with Changes/Refills   New Medications    SOD SULF-POT CHLORIDE-MAG SULF (SUTAB) 1.479-0.188- 0.225 GRAM TABLET    Take 12 tablets by mouth once daily. Take according to package instructions with indicated amount of water. No breakfast day before test. May substitute with Suprep, Clenpiq, Plenvu, Moviprep or GoLytely based on Rx plan and patient preference.   Current Medications    BETAMETHASONE VALERATE 0.1% (VALISONE) 0.1 % CREA    Apply topically 2 (two) times daily.    CELECOXIB (CELEBREX) 100 MG CAPSULE    Take 1 capsule (100 mg total) by mouth 2 (two) times daily.    CYCLOBENZAPRINE (FLEXERIL) 10 MG TABLET    Take 1 tablet (10 mg total) by mouth 2 (two) times daily as needed for Muscle spasms.    LEVOCETIRIZINE (XYZAL) 5  "MG TABLET    levocetirizine 5 mg tablet   TAKE 1 TABLET BY MOUTH EVERY DAY IN THE EVENING    POTASSIUM CHLORIDE SA (K-DUR,KLOR-CON) 20 MEQ TABLET    Take 1 tablet (20 mEq total) by mouth once daily. potassium chloride ER 20 mEq tablet,extended release(part/cryst)    TAMSULOSIN (FLOMAX) 0.4 MG CAP    Take 1 capsule (0.4 mg total) by mouth once daily.         Vital Signs:  /81   Pulse 83   Ht 5' 11" (1.803 m)   Wt 64 kg (141 lb)   SpO2 96%   BMI 19.67 kg/m²         Physical Exam  Constitutional:       General: He is not in acute distress.     Appearance: Normal appearance. He is well-developed. He is not ill-appearing or toxic-appearing.   HENT:      Head: Normocephalic and atraumatic.      Nose: Nose normal.      Mouth/Throat:      Mouth: Mucous membranes are moist.      Pharynx: Oropharynx is clear. No oropharyngeal exudate or posterior oropharyngeal erythema.   Eyes:      General: Lids are normal. No scleral icterus.        Right eye: No discharge.         Left eye: No discharge.      Conjunctiva/sclera: Conjunctivae normal.   Cardiovascular:      Rate and Rhythm: Normal rate and regular rhythm.      Pulses:           Radial pulses are 2+ on the right side and 2+ on the left side.   Pulmonary:      Effort: Pulmonary effort is normal. No respiratory distress.      Breath sounds: No stridor. No wheezing.   Abdominal:      General: Bowel sounds are normal. There is no distension.      Palpations: Abdomen is soft. There is no fluid wave, hepatomegaly, splenomegaly or mass.      Tenderness: There is no abdominal tenderness. There is no guarding or rebound.   Musculoskeletal:      Cervical back: Full passive range of motion without pain.   Lymphadenopathy:      Cervical: No cervical adenopathy.   Skin:     General: Skin is warm and dry.      Capillary Refill: Capillary refill takes less than 2 seconds.      Coloration: Skin is not cyanotic, jaundiced or pale.   Neurological:      General: No focal deficit " present.      Mental Status: He is alert and oriented to person, place, and time.   Psychiatric:         Mood and Affect: Mood normal.         Behavior: Behavior is cooperative.          All of the data above and below has been reviewed by myself and any further interpretations will be reflected in the assessment and plan.   The data includes review of external notes, and independent interpretation of lab results, procedures, x-rays, and imaging reports.      Assessment:  Rectal bleeding  -     Ambulatory Referral to External Surgery    Constipation, unspecified constipation type  -     Ambulatory referral/consult to Gastroenterology  -     Ambulatory Referral to External Surgery    Screening for malignant neoplasm of colon  -     Ambulatory Referral to External Surgery  -     sod sulf-pot chloride-mag sulf (SUTAB) 1.479-0.188- 0.225 gram tablet; Take 12 tablets by mouth once daily. Take according to package instructions with indicated amount of water. No breakfast day before test. May substitute with Suprep, Clenpiq, Plenvu, Moviprep or GoLytely based on Rx plan and patient preference.  Dispense: 24 tablet; Refill: 0    Constipation- chronic but has been causing urinary retention due to enlarge prostate. Will try Linzess 145mcg daily. May have to increase in to 290mcg  CRC screening- denies previous Colonoscopy  Rectal bleeding- Only when straining- likely secondary to hemorrhoids.     Recommendations:    Schedule for Colonoscopy at [facility] with Sutab.  Will give samples of Linzess 145 mcg and 290 mcg once daily x 2 weeks. If this works well for you let us know and we will send out prescription.      Risks, benefits, and alternatives of medical management, any associated procedures, and/or treatment discussed with the patient. Patient given opportunity to ask questions and voices understanding. Patient has elected to proceed with the recommended care modalities as discussed.    Follow up in about 1 year (around  6/6/2024).    Order summary:  Orders Placed This Encounter   Procedures    Ambulatory Referral to External Surgery        Instructed patient to notify my office if they have not been contacted within two weeks after any procedures, submitting any samples (biopsies, blood, stool, urine, etc.) or after any imaging (X-ray, CT, MRI, etc.).     Augustina Rasmussen MD    This document may have been created using a voice recognition transcribing system. Incorrect words or phrases may have been missed during proofreading. Please interpret accordingly or contact me for clarification.

## 2023-06-06 NOTE — PATIENT INSTRUCTIONS
Schedule for Colonoscopy at [facility] with Sutab.  Will give samples of Linzess 145 mcg and 290 mcg once daily x 2 weeks. If this works well for you let us know and we will send out prescription.    Please notify my office if you have not been contacted within two weeks after any procedures, submitting any samples (biopsies, blood, stool, urine, etc.) or after any imaging (X-ray, CT, MRI, etc.).

## 2023-06-06 NOTE — LETTER
June 6, 2023        Paul Villa, KAILA  [address]  [address]  [place] [address]             [facility]  [address] [address]  Phone: [phone]  Fax: [phone]   Patient: Jose Acosta   MR Number: [id number]   YOB: 1965   Date of Visit: 6/6/2023       Dear Dr. Villa:    Thank you for referring Jose Acosta to me for evaluation. Below are the relevant portions of my assessment and plan of care.            If you have questions, please do not hesitate to call me. I look forward to following Jose along with you.    Sincerely,      Augustina Rasmussen MD           CC  No Recipients

## 2023-06-07 ENCOUNTER — PATIENT MESSAGE (OUTPATIENT)
Dept: GASTROENTEROLOGY | Facility: CLINIC | Age: 58
End: 2023-06-07
Payer: COMMERCIAL

## 2023-06-20 ENCOUNTER — TELEPHONE (OUTPATIENT)
Dept: GASTROENTEROLOGY | Facility: CLINIC | Age: 58
End: 2023-06-20
Payer: COMMERCIAL

## 2023-06-20 NOTE — TELEPHONE ENCOUNTER
Called pt and left v/m that NBP on-call Friday is not 8/4/23, and would like to move him to 8/18/23 for his colonoscopy. Pending call back davidson.         Receive a return call from the pt and he stated moving to 8/18/23  would be fine. davidson

## 2023-06-21 ENCOUNTER — PATIENT MESSAGE (OUTPATIENT)
Dept: GASTROENTEROLOGY | Facility: CLINIC | Age: 58
End: 2023-06-21
Payer: COMMERCIAL

## 2023-06-21 DIAGNOSIS — K59.04 CHRONIC IDIOPATHIC CONSTIPATION: Primary | ICD-10-CM

## 2023-07-12 ENCOUNTER — PATIENT MESSAGE (OUTPATIENT)
Dept: UROLOGY | Facility: CLINIC | Age: 58
End: 2023-07-12
Payer: COMMERCIAL

## 2023-07-17 ENCOUNTER — TELEPHONE (OUTPATIENT)
Dept: UROLOGY | Facility: CLINIC | Age: 58
End: 2023-07-17
Payer: COMMERCIAL

## 2023-07-17 NOTE — TELEPHONE ENCOUNTER
Patient was not able to complete CT in the timeframe given by insurance due to not being aware of the status. Is there anyway we can submit re cert?

## 2023-07-18 ENCOUNTER — TELEPHONE (OUTPATIENT)
Dept: GASTROENTEROLOGY | Facility: CLINIC | Age: 58
End: 2023-07-18
Payer: COMMERCIAL

## 2023-07-18 DIAGNOSIS — K59.00 CONSTIPATION, UNSPECIFIED CONSTIPATION TYPE: ICD-10-CM

## 2023-07-18 DIAGNOSIS — K62.5 RECTAL BLEEDING: Primary | ICD-10-CM

## 2023-07-18 DIAGNOSIS — Z12.11 SCREENING FOR MALIGNANT NEOPLASM OF COLON: ICD-10-CM

## 2023-07-18 NOTE — TELEPHONE ENCOUNTER
Pt notified of CT results. Pt to follow up with GI to further evaluate lesion noted on liver. Pt has another colonoscopy scheduled for August 18, 2023. DAFNE

## 2023-07-18 NOTE — TELEPHONE ENCOUNTER
Spoke to patient and let him know that the CT scan is in his chart and Dr. Rasmussen has been notified to review it. -KG

## 2023-07-18 NOTE — TELEPHONE ENCOUNTER
----- Message from Marina Ernst sent at 7/18/2023  3:18 PM CDT -----  Contact: self  Have you received patient's referral from Urologist for lesion found on liver? Patient is requesting a call back. [phone]

## 2023-07-18 NOTE — TELEPHONE ENCOUNTER
"----- Message from Paul Villa NP sent at 7/18/2023  9:10 AM CDT -----  Please notify patient of abnormal test results.  Call patient notify no renal tumors or acute pathology is read by radiologist.  Several small cyst in each kidney no further workup indicated.  Radiologist states "liver lesion is noted" patient needs to follow-up with this with his GI specialist.  Four to copy to his GI doctor who performed his colonoscopy  "

## 2023-07-19 NOTE — TELEPHONE ENCOUNTER
Review with reading radiologist to confirm if liver lesion noted in report was accurate or a typo. I reviewed the images and do not appreciate a liver lesion.   If accurate, then size? Does it enhance? And any prior imaging in their system to compare?  If typo, then confirm addendum to clarify will be submitted and have staff inform patient.  NBP

## 2023-07-19 NOTE — TELEPHONE ENCOUNTER
Spoke with Dr. Hammond. Transcription dropped off the word NO . He reviewed and adding addendum correcting to NO liver lesion. Called patient and discussed that the liver lesion noted on CT was an error and he does not have a liver lesion.  ISABEL

## 2023-08-10 VITALS — HEIGHT: 71 IN | BODY MASS INDEX: 19.74 KG/M2 | WEIGHT: 141 LBS

## 2023-08-10 NOTE — TELEPHONE ENCOUNTER
"[facility]  [address] [address]  Phone: [phone]  Fax: [phone]    History & Physical         Provider: Dr. Augustina Rasmussen    Patient Name: Jose DURHAM (age):1965  58 y.o.           Gender: male   Phone: [phone]     Referring Physician: Bel Bowser     Vital Signs:   Height - 5' 11"  Weight - 141 lb  BMI -  19.67    Plan: Colonoscopy @[facility]    Encounter Diagnoses   Name Primary?    Rectal bleeding Yes    Constipation, unspecified constipation type     Screening for malignant neoplasm of colon            History:      Past Medical History:   Diagnosis Date    Allergy     BPH (benign prostatic hyperplasia)     Chronic constipation     Hypochloremia     Rheumatoid arthritis involving multiple sites       No past surgical history on file.   Medication List with Changes/Refills   Current Medications    BETAMETHASONE VALERATE 0.1% (VALISONE) 0.1 % CREA    Apply topically 2 (two) times daily.    CELECOXIB (CELEBREX) 100 MG CAPSULE    Take 1 capsule (100 mg total) by mouth 2 (two) times daily.    CYCLOBENZAPRINE (FLEXERIL) 10 MG TABLET    Take 1 tablet (10 mg total) by mouth 2 (two) times daily as needed for Muscle spasms.    LEVOCETIRIZINE (XYZAL) 5 MG TABLET    levocetirizine 5 mg tablet   TAKE 1 TABLET BY MOUTH EVERY DAY IN THE EVENING    LINACLOTIDE (LINZESS) 145 MCG CAP CAPSULE    Take 1 capsule (145 mcg total) by mouth once daily.    POTASSIUM CHLORIDE SA (K-DUR,KLOR-CON) 20 MEQ TABLET    Take 1 tablet (20 mEq total) by mouth once daily. potassium chloride ER 20 mEq tablet,extended release(part/cryst)    SOD SULF-POT CHLORIDE-MAG SULF (SUTAB) 1.479-0.188- 0.225 GRAM TABLET    Take 12 tablets by mouth once daily. Take according to package instructions with indicated amount of water. No breakfast day before test. May substitute with Suprep, Clenpiq, Plenvu, Moviprep or " GoLytely based on Rx plan and patient preference.    TAMSULOSIN (FLOMAX) 0.4 MG CAP    Take 1 capsule (0.4 mg total) by mouth once daily.      Review of patient's allergies indicates:   Allergen Reactions    Codeine       Family History   Problem Relation Age of Onset    No Known Problems Mother     Cancer Father       Social History     Tobacco Use    Smoking status: Never    Smokeless tobacco: Never   Substance Use Topics    Alcohol use: Not Currently     Comment: rarely    Drug use: Not Currently        Physical Examination:     General Appearance:___________________________  HEENT: _____________________________________  Abdomen:____________________________________  Heart:________________________________________  Lungs:_______________________________________  Extremities:___________________________________  Skin:_________________________________________  Endocrine:____________________________________  Genitourinary:_________________________________  Neurological:__________________________________      Patient has been evaluated immediately prior to sedation and is medically cleared for endoscopy with IVCS as an ASA class: ______      Physician Signature: _________________________       Date: ________  Time: ________

## 2023-08-15 ENCOUNTER — PATIENT MESSAGE (OUTPATIENT)
Dept: GASTROENTEROLOGY | Facility: CLINIC | Age: 58
End: 2023-08-15
Payer: COMMERCIAL

## 2023-08-18 ENCOUNTER — TELEPHONE (OUTPATIENT)
Dept: GASTROENTEROLOGY | Facility: CLINIC | Age: 58
End: 2023-08-18

## 2023-08-18 ENCOUNTER — OUTSIDE PLACE OF SERVICE (OUTPATIENT)
Dept: GASTROENTEROLOGY | Facility: CLINIC | Age: 58
End: 2023-08-18

## 2023-08-18 DIAGNOSIS — K59.04 CHRONIC IDIOPATHIC CONSTIPATION: ICD-10-CM

## 2023-08-18 DIAGNOSIS — K58.1 IRRITABLE BOWEL SYNDROME WITH CONSTIPATION: Primary | ICD-10-CM

## 2023-08-18 DIAGNOSIS — K59.00 CONSTIPATION, UNSPECIFIED CONSTIPATION TYPE: ICD-10-CM

## 2023-08-18 LAB — CRC RECOMMENDATION EXT: NORMAL

## 2023-08-18 PROCEDURE — 45385 COLONOSCOPY W/LESION REMOVAL: CPT | Mod: ,,, | Performed by: INTERNAL MEDICINE

## 2023-08-18 PROCEDURE — 45385 PR COLONOSCOPY,REMV LESN,SNARE: ICD-10-PCS | Mod: ,,, | Performed by: INTERNAL MEDICINE

## 2023-08-21 ENCOUNTER — PATIENT MESSAGE (OUTPATIENT)
Dept: GASTROENTEROLOGY | Facility: CLINIC | Age: 58
End: 2023-08-21
Payer: COMMERCIAL

## 2023-08-25 ENCOUNTER — TELEPHONE (OUTPATIENT)
Dept: GASTROENTEROLOGY | Facility: CLINIC | Age: 58
End: 2023-08-25
Payer: COMMERCIAL

## 2023-08-25 DIAGNOSIS — K59.04 CHRONIC IDIOPATHIC CONSTIPATION: Primary | ICD-10-CM

## 2023-08-25 NOTE — TELEPHONE ENCOUNTER
1 TVA, 1 hyp, repeat colonoscopy in 3 years.     Notify patient. His colon polyps were benign. Recommend repeat colonoscopy in 3 years. Did the Linzess 290 (up from 145) help his BMs? Pending final results from colon transit study. Update in Health Maintenance section of Epic.  NBP

## 2023-08-29 ENCOUNTER — TELEPHONE (OUTPATIENT)
Dept: GASTROENTEROLOGY | Facility: CLINIC | Age: 58
End: 2023-08-29
Payer: COMMERCIAL

## 2023-08-29 NOTE — TELEPHONE ENCOUNTER
8/28/2023 Sitz day 6: 1 retained in rectum.    Call with results. 1 retained sitzmarker so overall his colon transit is normal. It did still show that he has moderate stool in his colon. Has he been able to try the higher dose of linzess yet? And is it working well for him?  KN

## 2023-08-30 NOTE — TELEPHONE ENCOUNTER
I spoke to patient and gave results.  He states the higher dose of the Linzess is helping.  He will call if he needs anything else

## 2023-09-17 DIAGNOSIS — E87.6 HYPOKALEMIA: Chronic | ICD-10-CM

## 2023-09-19 RX ORDER — POTASSIUM CHLORIDE 20 MEQ/1
20 TABLET, EXTENDED RELEASE ORAL
Qty: 90 TABLET | Refills: 3 | Status: SHIPPED | OUTPATIENT
Start: 2023-09-19

## 2023-09-29 ENCOUNTER — DOCUMENTATION ONLY (OUTPATIENT)
Dept: GASTROENTEROLOGY | Facility: CLINIC | Age: 58
End: 2023-09-29
Payer: COMMERCIAL

## 2023-10-18 ENCOUNTER — OFFICE VISIT (OUTPATIENT)
Dept: UROLOGY | Facility: CLINIC | Age: 58
End: 2023-10-18
Payer: COMMERCIAL

## 2023-10-18 DIAGNOSIS — N41.0 ACUTE PROSTATITIS: ICD-10-CM

## 2023-10-18 DIAGNOSIS — R31.29 HEMATURIA, MICROSCOPIC: ICD-10-CM

## 2023-10-18 DIAGNOSIS — R39.198 DIFFICULTY VOIDING: Primary | ICD-10-CM

## 2023-10-18 LAB
BILIRUBIN, UA POC OHS: NEGATIVE
BLOOD, UA POC OHS: ABNORMAL
CLARITY, UA POC OHS: CLEAR
COLOR, UA POC OHS: YELLOW
GLUCOSE, UA POC OHS: NEGATIVE
KETONES, UA POC OHS: NEGATIVE
LEUKOCYTES, UA POC OHS: NEGATIVE
NITRITE, UA POC OHS: NEGATIVE
PH, UA POC OHS: 6.5
PROTEIN, UA POC OHS: NEGATIVE
SPECIFIC GRAVITY, UA POC OHS: 1.02
UROBILINOGEN, UA POC OHS: 0.2

## 2023-10-18 PROCEDURE — 1159F MED LIST DOCD IN RCRD: CPT | Mod: CPTII,S$GLB,, | Performed by: NURSE PRACTITIONER

## 2023-10-18 PROCEDURE — 99214 OFFICE O/P EST MOD 30 MIN: CPT | Mod: S$GLB,,, | Performed by: NURSE PRACTITIONER

## 2023-10-18 PROCEDURE — 1160F PR REVIEW ALL MEDS BY PRESCRIBER/CLIN PHARMACIST DOCUMENTED: ICD-10-PCS | Mod: CPTII,S$GLB,, | Performed by: NURSE PRACTITIONER

## 2023-10-18 PROCEDURE — 81003 URINALYSIS AUTO W/O SCOPE: CPT | Mod: QW,S$GLB,, | Performed by: NURSE PRACTITIONER

## 2023-10-18 PROCEDURE — 1160F RVW MEDS BY RX/DR IN RCRD: CPT | Mod: CPTII,S$GLB,, | Performed by: NURSE PRACTITIONER

## 2023-10-18 PROCEDURE — 81003 POCT URINALYSIS(INSTRUMENT): ICD-10-PCS | Mod: QW,S$GLB,, | Performed by: NURSE PRACTITIONER

## 2023-10-18 PROCEDURE — 99214 PR OFFICE/OUTPT VISIT, EST, LEVL IV, 30-39 MIN: ICD-10-PCS | Mod: S$GLB,,, | Performed by: NURSE PRACTITIONER

## 2023-10-18 PROCEDURE — 1159F PR MEDICATION LIST DOCUMENTED IN MEDICAL RECORD: ICD-10-PCS | Mod: CPTII,S$GLB,, | Performed by: NURSE PRACTITIONER

## 2023-10-18 RX ORDER — SULFAMETHOXAZOLE AND TRIMETHOPRIM 800; 160 MG/1; MG/1
1 TABLET ORAL 2 TIMES DAILY
Qty: 60 TABLET | Refills: 0 | Status: SHIPPED | OUTPATIENT
Start: 2023-10-18 | End: 2024-03-06

## 2023-10-18 NOTE — PROGRESS NOTES
Subjective:       Patient ID: Jose Acosta is a 58 y.o. male.    Chief Complaint: Urinary Retention      HPI: 58-year-old male six-month follow-up history of BPH with LUTS.  Last visit up his Flomax to 0.8 mg daily.  PSA April of this year 1.0.  He states he did not see much improvement in his stream.  He has figured out that highly seasoned foods lock his prostate up he is changed his diet and he states he is doing well on Flomax 0.4 mg daily.  His PVR today is 0 mL.  Also has a history of micro hematuria with 5-10 red cells.  He had a negative urine cytology no tumors were identified in the  tract and cystoscopy was negative.  He had a repeat CT scan in July of this year with no acute findings other than the previously identified renal cyst described as no change.  Urinalysis today shows slight increase in the red cells to 10-20.         Past Medical History:   Past Medical History:   Diagnosis Date    Allergy     BPH (benign prostatic hyperplasia)     Chronic constipation     Hypochloremia     Rheumatoid arthritis involving multiple sites        Past Surgical Historical: History reviewed. No pertinent surgical history.     Medications:   Medication List with Changes/Refills   Current Medications    BETAMETHASONE VALERATE 0.1% (VALISONE) 0.1 % CREA    Apply topically 2 (two) times daily.    CELECOXIB (CELEBREX) 100 MG CAPSULE    Take 1 capsule (100 mg total) by mouth 2 (two) times daily.    CYCLOBENZAPRINE (FLEXERIL) 10 MG TABLET    Take 1 tablet (10 mg total) by mouth 2 (two) times daily as needed for Muscle spasms.    LEVOCETIRIZINE (XYZAL) 5 MG TABLET    levocetirizine 5 mg tablet   TAKE 1 TABLET BY MOUTH EVERY DAY IN THE EVENING    LINACLOTIDE (LINZESS) 290 MCG CAP CAPSULE    Take 1 capsule (290 mcg total) by mouth once daily.    POTASSIUM CHLORIDE SA (K-DUR,KLOR-CON) 20 MEQ TABLET    TAKE 1 TABLET BY MOUTH ONCE DAILY    TAMSULOSIN (FLOMAX) 0.4 MG CAP    Take 1 capsule (0.4 mg total) by mouth once daily.    Discontinued Medications    SOD SULF-POT CHLORIDE-MAG SULF (SUTAB) 1.479-0.188- 0.225 GRAM TABLET    Take 12 tablets by mouth once daily. Take according to package instructions with indicated amount of water. No breakfast day before test. May substitute with Suprep, Clenpiq, Plenvu, Moviprep or GoLytely based on Rx plan and patient preference.        Past Social History:   Social History     Socioeconomic History    Marital status:    Occupational History    Occupation:    Tobacco Use    Smoking status: Never    Smokeless tobacco: Never   Substance and Sexual Activity    Alcohol use: Not Currently     Comment: rarely    Drug use: Not Currently       Allergies:   Review of patient's allergies indicates:   Allergen Reactions    Codeine         Family History:   Family History   Problem Relation Age of Onset    No Known Problems Mother     Cancer Father         Review of Systems:  Review of Systems   Constitutional:  Negative for activity change and appetite change.   HENT:  Negative for congestion and dental problem.    Eyes:  Negative for visual disturbance.   Respiratory:  Negative for chest tightness and shortness of breath.    Cardiovascular:  Negative for chest pain.   Gastrointestinal:  Negative for abdominal distention and abdominal pain.   Genitourinary:  Negative for decreased urine volume, difficulty urinating, dysuria, enuresis, flank pain, frequency, genital sores, hematuria, penile discharge, penile pain, penile swelling, scrotal swelling, testicular pain and urgency.   Musculoskeletal:  Negative for back pain and neck pain.   Skin:  Negative for color change.   Neurological:  Negative for dizziness.   Hematological:  Negative for adenopathy.   Psychiatric/Behavioral:  Negative for agitation, behavioral problems and confusion.        Physical Exam:  Physical Exam  Constitutional:       Appearance: He is well-developed.   HENT:      Head: Normocephalic.   Eyes:      General: No  scleral icterus.  Pulmonary:      Effort: Pulmonary effort is normal.      Breath sounds: Normal breath sounds.   Abdominal:      General: There is no distension.      Palpations: Abdomen is soft.      Tenderness: There is no abdominal tenderness.      Hernia: No hernia is present. There is no hernia in the right inguinal area or left inguinal area.   Genitourinary:     Penis: Normal.       Testes: Normal. Cremasteric reflex is present.      Prostate: Enlarged (Tender boggy without discrete nodule or induration).   Musculoskeletal:      Cervical back: Normal range of motion.   Skin:     General: Skin is warm and dry.   Neurological:      Mental Status: He is alert and oriented to person, place, and time.         Assessment/Plan:   Micro hematuria--slight increase in the red cells otherwise negative in his urine.  Cytology on today's urine.    Acute prostatitis on exam--boggy prostate which may be the cause of the increase red cells in his urine.Rx Bactrim repeat UA 6 weeks     BPH with LUTS--doing well on Flomax 0.4 mg daily continue same  Problem List Items Addressed This Visit    None  Visit Diagnoses       Difficulty voiding    -  Primary    Relevant Orders    POCT Bladder Scan

## 2023-10-19 ENCOUNTER — CLINICAL SUPPORT (OUTPATIENT)
Dept: UROLOGY | Facility: CLINIC | Age: 58
End: 2023-10-19
Payer: COMMERCIAL

## 2023-10-19 DIAGNOSIS — R39.198 DIFFICULTY VOIDING: Primary | ICD-10-CM

## 2023-10-19 NOTE — PROGRESS NOTES
Pt came in to give urine for cytology collected on yesterday. He did not have enough to proceed with testing so he came in on today to give a sample.

## 2023-10-24 ENCOUNTER — OFFICE VISIT (OUTPATIENT)
Dept: INFECTIOUS DISEASES | Facility: CLINIC | Age: 58
End: 2023-10-24
Payer: COMMERCIAL

## 2023-10-24 VITALS — SYSTOLIC BLOOD PRESSURE: 129 MMHG | HEART RATE: 98 BPM | OXYGEN SATURATION: 98 % | DIASTOLIC BLOOD PRESSURE: 80 MMHG

## 2023-10-24 DIAGNOSIS — E78.2 MIXED HYPERLIPIDEMIA: ICD-10-CM

## 2023-10-24 DIAGNOSIS — Z79.899 LONG TERM USE OF DRUG: ICD-10-CM

## 2023-10-24 DIAGNOSIS — N28.1 RENAL CYSTS, ACQUIRED, BILATERAL: ICD-10-CM

## 2023-10-24 DIAGNOSIS — E87.6 HYPOKALEMIA: Chronic | ICD-10-CM

## 2023-10-24 DIAGNOSIS — Z13.220 SCREENING FOR LIPOID DISORDERS: ICD-10-CM

## 2023-10-24 DIAGNOSIS — N41.9 PROSTATITIS, UNSPECIFIED PROSTATITIS TYPE: ICD-10-CM

## 2023-10-24 DIAGNOSIS — I10 BENIGN HYPERTENSION: Primary | ICD-10-CM

## 2023-10-24 DIAGNOSIS — R62.7 FAILURE TO GAIN WEIGHT IN ADULT: ICD-10-CM

## 2023-10-24 PROCEDURE — 99214 OFFICE O/P EST MOD 30 MIN: CPT | Mod: S$GLB,,, | Performed by: NURSE PRACTITIONER

## 2023-10-24 PROCEDURE — 4010F PR ACE/ARB THEARPY RXD/TAKEN: ICD-10-PCS | Mod: CPTII,S$GLB,, | Performed by: NURSE PRACTITIONER

## 2023-10-24 PROCEDURE — 4010F ACE/ARB THERAPY RXD/TAKEN: CPT | Mod: CPTII,S$GLB,, | Performed by: NURSE PRACTITIONER

## 2023-10-24 PROCEDURE — 3074F SYST BP LT 130 MM HG: CPT | Mod: CPTII,S$GLB,, | Performed by: NURSE PRACTITIONER

## 2023-10-24 PROCEDURE — 3079F PR MOST RECENT DIASTOLIC BLOOD PRESSURE 80-89 MM HG: ICD-10-PCS | Mod: CPTII,S$GLB,, | Performed by: NURSE PRACTITIONER

## 2023-10-24 PROCEDURE — 3079F DIAST BP 80-89 MM HG: CPT | Mod: CPTII,S$GLB,, | Performed by: NURSE PRACTITIONER

## 2023-10-24 PROCEDURE — 1159F PR MEDICATION LIST DOCUMENTED IN MEDICAL RECORD: ICD-10-PCS | Mod: CPTII,S$GLB,, | Performed by: NURSE PRACTITIONER

## 2023-10-24 PROCEDURE — 99214 PR OFFICE/OUTPT VISIT, EST, LEVL IV, 30-39 MIN: ICD-10-PCS | Mod: S$GLB,,, | Performed by: NURSE PRACTITIONER

## 2023-10-24 PROCEDURE — 3074F PR MOST RECENT SYSTOLIC BLOOD PRESSURE < 130 MM HG: ICD-10-PCS | Mod: CPTII,S$GLB,, | Performed by: NURSE PRACTITIONER

## 2023-10-24 PROCEDURE — 1159F MED LIST DOCD IN RCRD: CPT | Mod: CPTII,S$GLB,, | Performed by: NURSE PRACTITIONER

## 2023-10-24 RX ORDER — LISINOPRIL 2.5 MG/1
2.5 TABLET ORAL DAILY
Qty: 30 TABLET | Refills: 0 | Status: SHIPPED | OUTPATIENT
Start: 2023-10-24 | End: 2023-11-15

## 2023-10-24 NOTE — ASSESSMENT & PLAN NOTE
Multiple readings at home above 140s and 90s.      Plan     1. Start low dose lisinopril.  I explained to the patient that this issue may improve once his prostatitis has resolved.  He was advised on ACE inhibitor cough as well as orthostatic hypotension. If these develop he will notify me and/or follow up.   2. Will get CMP given his history of hypokalemia

## 2023-10-24 NOTE — PROGRESS NOTES
Subjective:      Patient ID: Jose Acosta is a 58 y.o. male.    Chief Complaint: Pt states BP at home is running high: 145/90      58-year-old male         I am seeing this patient of KAILA Bowser's while out on medical leave. Pt is new to me.     The patient is here today for elevated blood pressure readings.  He reports numerous readings over the past 2-3 weeks above 140 systolic and 90s diastolic. Denies symptoms currently.  Denies CP, SOB, JEWELL, palpitations, HA, visual disturbances. He reports difficulty gaining weight and frequency. He reports urinating 4-6 times per day. Recently seen by Urology as noted below. He is currently on potassium supplements. He is not on diuretics. Tells me at one point he was taking Kdur tid.  He states his hypokalemia would get so bad that he was unable to walk due to myalgias.  Denies nausea, vomiting, diarrhea.  He does have chronic constipation which he takes Linzess for.       history of BPH with LUTS.  Currently on Flomax 0.4 mg. Recently seen by urology. Noted to have prostatitis and put on Bactrim x 6 weeks.  He has a history of micro hematuria with 5-10 red cells.  He had a negative urine cytology no tumors were identified in the  tract and cystoscopy was negative.  He had a repeat CT scan in July of this year with no acute findings other than the previously identified renal cyst described as no change.  Recent UA showed slight increase in the red cells to 10-20.  He tells me that is stream is improving and retention has improved since starting the Bactrim.       Past Medical History:   Diagnosis Date    Allergy     BPH (benign prostatic hyperplasia)     Chronic constipation     Hypochloremia       Social History     Socioeconomic History    Marital status:    Occupational History    Occupation:    Tobacco Use    Smoking status: Never    Smokeless tobacco: Never   Substance and Sexual Activity    Alcohol use: Not Currently     Comment: rarely     Drug use: Not Currently      Family History   Problem Relation Age of Onset    No Known Problems Mother     Cancer Father         ROS:   Review of Systems   Constitutional:  Negative for activity change, appetite change, fever and unexpected weight change.   HENT:  Negative for congestion and dental problem.    Eyes:  Negative for photophobia and visual disturbance.   Respiratory:  Negative for chest tightness and shortness of breath.    Cardiovascular:  Negative for chest pain, palpitations and leg swelling.   Gastrointestinal:  Positive for constipation (chronic). Negative for abdominal distention, abdominal pain, diarrhea, nausea and vomiting.   Genitourinary:  Positive for difficulty urinating and frequency. Negative for decreased urine volume, dysuria, enuresis, flank pain, genital sores, hematuria, penile discharge, penile pain, penile swelling, scrotal swelling, testicular pain and urgency.   Musculoskeletal:  Negative for back pain and neck pain.   Skin:  Negative for color change and rash.   Neurological:  Negative for dizziness, syncope, weakness, light-headedness and headaches.   Hematological:  Negative for adenopathy.   Psychiatric/Behavioral:  Negative for agitation, behavioral problems and confusion.      Objective:   Physical Exam  Vitals and nursing note reviewed.   Constitutional:       General: He is awake. He is not in acute distress.     Appearance: Normal appearance. He is well-developed and well-groomed. He is not ill-appearing.   HENT:      Head: Normocephalic and atraumatic.      Mouth/Throat:      Mouth: Mucous membranes are moist.   Eyes:      General: No scleral icterus.     Conjunctiva/sclera: Conjunctivae normal.   Neck:      Vascular: No carotid bruit.      Trachea: Trachea normal.   Cardiovascular:      Rate and Rhythm: Normal rate and regular rhythm.      Pulses: Normal pulses.      Heart sounds: Normal heart sounds. No murmur heard.     No friction rub. No gallop.   Pulmonary:       Effort: Pulmonary effort is normal.      Breath sounds: Normal breath sounds. No wheezing or rales.   Abdominal:      General: Abdomen is flat.   Musculoskeletal:         General: Normal range of motion.      Cervical back: Normal range of motion and neck supple.      Right lower leg: No edema.      Left lower leg: No edema.   Skin:     General: Skin is warm and dry.      Coloration: Skin is not jaundiced.      Findings: No rash.   Neurological:      Mental Status: He is alert and oriented to person, place, and time. Mental status is at baseline.   Psychiatric:         Mood and Affect: Mood normal.         Speech: Speech normal.         Behavior: Behavior normal. Behavior is cooperative.       Assessment:     1. Benign hypertension    2. Hypokalemia    3. Failure to gain weight in adult    4. Long term use of drug    5. Screening for lipoid disorders    6. Mixed hyperlipidemia    7. Prostatitis, unspecified prostatitis type    8. Renal cysts, acquired, bilateral      No images are attached to the encounter.   Plan:     Problem List Items Addressed This Visit          Cardiac/Vascular    Benign hypertension - Primary    Current Assessment & Plan       Multiple readings at home above 140s and 90s.      Plan     1. Start low dose lisinopril.  I explained to the patient that this issue may improve once his prostatitis has resolved.  He was advised on ACE inhibitor cough as well as orthostatic hypotension. If these develop he will notify me and/or follow up.   2. Will get CMP given his history of hypokalemia         Relevant Medications    lisinopriL (PRINIVIL,ZESTRIL) 2.5 MG tablet    Other Relevant Orders    ACTH    Cortisol, 8AM    Comprehensive Metabolic Panel    CBC Auto Differential    Lipid Panel    Magnesium    TSH    Aldosterone    Mixed hyperlipidemia    Current Assessment & Plan      We discussed his LDL. Will repeat lipid panel.  Make recs at follow up visit in 3 weeks.             Renal/    Hypokalemia  (Chronic)    Overview                Current Assessment & Plan         The patient is not on diuretics. Need to consider this related to GI issues, however, he would benefit from adrenal workup given his complaint of inability to maintain/gain weight.  Gastrointestinal and renal hydrogen loss is usually accompanied by the loss of chloride and potassium      PLAN    1. Continue K dur once daily.   2. Repeat CMP. PT advised lisinopril can cause hyperkalemia. Would need to monitory for s/s.              Relevant Medications    lisinopriL (PRINIVIL,ZESTRIL) 2.5 MG tablet    Other Relevant Orders    ACTH    Cortisol, 8AM    Comprehensive Metabolic Panel    CBC Auto Differential    Lipid Panel    Magnesium    TSH    Aldosterone    Renal cysts, acquired, bilateral    Current Assessment & Plan           There are small cystlike lesions in each kidney. No suspicious renal lesion    is noted. No hydronephrosis is detected. No urinary tract calculus is noted.       We discussed PCKD and impact of HTN and progression. This is followed by Urology. We briefly discussed the topic for education purposes. No further action needed other than managing BP and avoidance of NSAIDs in high doses.           Other Visit Diagnoses       Failure to gain weight in adult        Relevant Orders    ACTH    Cortisol, 8AM    Comprehensive Metabolic Panel    CBC Auto Differential    Lipid Panel    Magnesium    TSH    Aldosterone    Long term use of drug        Relevant Orders    ACTH    Cortisol, 8AM    Comprehensive Metabolic Panel    CBC Auto Differential    Lipid Panel    Magnesium    TSH    Screening for lipoid disorders        Relevant Orders    ACTH    Cortisol, 8AM    Comprehensive Metabolic Panel    CBC Auto Differential    Lipid Panel    Magnesium    TSH    Prostatitis, unspecified prostatitis type        Currently on Bactrim x 6 weeks. LUTS improving.           Procedures     Office Visit on 10/18/2023   Component Date Value Ref Range  Status    Color, POC UA 10/18/2023 Yellow  Yellow, Straw, Colorless Final    Clarity, POC UA 10/18/2023 Clear  Clear Final    Glucose, POC UA 10/18/2023 Negative  Negative Final    Bilirubin, POC UA 10/18/2023 Negative  Negative Final    Ketones, POC UA 10/18/2023 Negative  Negative Final    Spec Grav POC UA 10/18/2023 1.025  1.005 - 1.030 Final    Blood, POC UA 10/18/2023 Small (A)  Negative Final    pH, POC UA 10/18/2023 6.5  5.0 - 8.0 Final    Protein, POC UA 10/18/2023 Negative  Negative Final    Urobilinogen, POC UA 10/18/2023 0.2  <=1.0 Final    Nitrite, POC UA 10/18/2023 Negative  Negative Final    WBC, POC UA 10/18/2023 Negative  Negative Final   Documentation Only on 09/29/2023   Component Date Value Ref Range Status    CRC Recommendation External 08/18/2023 Repeat colonoscopy in 3 years   Final        No results found in the last 30 days.       Duration of encounter:  30 minutes  This includes face-to-face time and non face-to-face time preparing to see the patient (eg, review of tests), obtaining and/or reviewing separately obtained history, documenting clinical information in the electronic or other health record, independently interpreting resultsand communicating results to the patient/family/caregiver, or care coordination    All diagnostic data (labs/imaging) was reviewed with the patient and/or family member in the room.  All questions were answered to their liking. The patient and/or family member voiced understanding of all instructions provided. Expectations regarding follow up and treatment plan were voiced and confirmed prior to departure. The patient was given orders/instructions at the end of the visit for reference. They were instructed to notify my office if they have not been contacted for imaging/referrals/labs/results in 1-2 weeks. They voiced understanding of all of the above.     Follow up:     Patient Instructions     Lisinopril (lyse [place] oh pril)   Brand Names: [place] Prinivil; Qbrelis;  Zestril   Brand Names: Brian ACT Lisinopril [DSC]; APO-Lisinopril; Auro-Lisinopril; DOM-Lisinopril; JAMP-Lisinopril; MYLAN-Lisinopril [DSC]; PMS-Lisinopril; Prinivil [DSC]; PRO-Lisinopril-10; PRO-Lisinopril-20; PRO-Lisinopril-5; RAN-Lisinopril; JAYLIN-Lisinopril [DSC]; SANDOZ Lisinopril [DSC]; TEVA-Lisinopril (Type P); TEVA-Lisinopril (Type Z); Zestril   Warning   Do not take if you are pregnant. Use during pregnancy may cause birth defects or loss of the unborn baby. If you get pregnant or plan on getting pregnant while taking this drug, call your doctor right away.     What is this drug used for?   It is used to treat high blood pressure.  It is used to treat heart failure (weak heart).  It is used to help heart function after a heart attack.  It may be given to you for other reasons. Talk with the doctor.     What do I need to tell my doctor BEFORE I take this drug?   For all patients taking this drug:   If you are allergic to this drug; any part of this drug; or any other drugs, foods, or substances. Tell your doctor about the allergy and what signs you had.  If you have ever had a very bad or life-threatening reaction called angioedema. Signs may be swelling of the hands, face, lips, eyes, tongue, or throat; trouble breathing; trouble swallowing; unusual hoarseness.  If you have kidney problems.  If you are taking a drug that has aliskiren in it and you also have diabetes or kidney problems.  If you have taken a drug that has sacubitril in it in the last 36 hours.  If you are breast-feeding. Do not breast-feed while you take this drug.  Children:   If your child is younger than 6 years of age. Do not give this drug to a child younger than 6 years of age.  This is not a list of all drugs or health problems that interact with this drug.  Tell your doctor and pharmacist about all of your drugs (prescription or OTC, natural products, vitamins) and health problems. You must check to make sure that it is safe for you  to take this drug with all of your drugs and health problems. Do not start, stop, or change the dose of any drug without checking with your doctor.  What are some things I need to know or do while I take this drug?   Tell all of your health care providers that you take this drug. This includes your doctors, nurses, pharmacists, and dentists.  Avoid driving and doing other tasks or actions that call for you to be alert until you see how this drug affects you.  To lower the chance of feeling dizzy or passing out, rise slowly if you have been sitting or lying down. Be careful going up and down stairs.  If you have high blood sugar (diabetes), you will need to watch your blood sugar closely.  Check your blood pressure as you have been told.  Have blood work checked as you have been told by the doctor. Talk with the doctor.  If you are taking a salt substitute that has potassium in it, a potassium-sparing diuretic, or a potassium product, talk with your doctor.  If you are on a low-salt or salt-free diet, talk with your doctor.  If you are taking this drug and have high blood pressure, talk with your doctor before using OTC products that may raise blood pressure. These include cough or cold drugs, diet pills, stimulants, non-steroidal anti-inflammatory drugs (NSAIDs) like ibuprofen or naproxen, and some natural products or aids.  Talk with your doctor before you drink alcohol.  Be careful in hot weather or while being active. Drink lots of fluids to stop fluid loss.  Tell your doctor if you have too much sweat, fluid loss, throwing up, or loose stools. This may lead to low blood pressure.  This drug may not work as well to lower blood pressure in Black patients. Sometimes another drug may need to be given with this drug. If you have any questions, talk with the doctor.  A severe and sometimes deadly reaction called angioedema has happened. The chance of angioedema may be higher in Black patients.  Low white blood cell  counts have happened with captopril, a drug like this one. This may lead to more chance of getting an infection. Most of the time, this has happened in people with kidney problems, mainly if they have certain other health problems. Call your doctor right away if you have signs of infection like fever, chills, or sore throat.  If you are 65 or older, use this drug with care. You could have more side effects.     What are some side effects that I need to call my doctor about right away?   WARNING/CAUTION: Even though it may be rare, some people may have very bad and sometimes deadly side effects when taking a drug. Tell your doctor or get medical help right away if you have any of the following signs or symptoms that may be related to a very bad side effect:  Signs of an allergic reaction, like rash; hives; itching; red, swollen, blistered, or peeling skin with or without fever; wheezing; tightness in the chest or throat; trouble breathing, swallowing, or talking; unusual hoarseness; or swelling of the mouth, face, lips, tongue, or throat.  Signs of kidney problems like unable to pass urine, change in how much urine is passed, blood in the urine, or a big weight gain.  Signs of high potassium levels like a heartbeat that does not feel normal; feeling confused; feeling weak, lightheaded, or dizzy; feeling like passing out; numbness or tingling; or shortness of breath.  Very bad dizziness or passing out.  Cough that does not go away.  Very bad belly pain.  Very upset stomach or throwing up.  Chest pain or pressure.  Liver problems have happened with drugs like this one. Sometimes, this has been deadly. Call your doctor right away if you have signs of liver problems like dark urine, feeling tired, not hungry, upset stomach or stomach pain, light-colored stools, throwing up, or yellow skin or eyes.  What are some other side effects of this drug?   All drugs may cause side effects. However, many people have no side effects  or only have minor side effects. Call your doctor or get medical help if any of these side effects or any other side effects bother you or do not go away:  Dizziness or headache.  Cough.  These are not all of the side effects that may occur. If you have questions about side effects, call your doctor. Call your doctor for medical advice about side effects.  You may report side effects to your national health agency.  You may report side effects to the FDA at 1-323.839.7044. You may also report side effects at https://www.fda.gov/medwatch.  How is this drug best taken?   Use this drug as ordered by your doctor. Read all information given to you. Follow all instructions closely.  All products:   Take with or without food.  Take this drug at the same time of day.  Keep taking this drug as you have been told by your doctor or other health care provider, even if you feel well.  Drink lots of noncaffeine liquids unless told to drink less liquid by your doctor.  Tablets:   A liquid (suspension) can be made if you cannot swallow pills. Talk with your doctor or pharmacist.  If a liquid (suspension) is made, shake well before use.  All liquid products:   Measure liquid doses carefully. Use the measuring device that comes with this drug. If there is none, ask the pharmacist for a device to measure this drug.  What do I do if I miss a dose?   Take a missed dose as soon as you think about it.  If it is close to the time for your next dose, skip the missed dose and go back to your normal time.  Do not take 2 doses at the same time or extra doses.     How do I store and/or throw out this drug?   All products:   Store at room temperature in a dry place. Do not store in a bathroom.  Keep lid tightly closed.  Keep all drugs in a safe place. Keep all drugs out of the reach of children and pets.  Throw away unused or  drugs. Do not flush down a toilet or pour down a drain unless you are told to do so. Check with your pharmacist  if you have questions about the best way to throw out drugs. There may be drug take-back programs in your area.  Tablets:   If a liquid (suspension) is made from the tablets, throw away any part not used after 28 days.  Liquid (solution):   Do not freeze.  Protect from heat.  General drug facts   If your symptoms or health problems do not get better or if they become worse, call your doctor.  Do not share your drugs with others and do not take anyone else's drugs.  Some drugs may have another patient information leaflet. If you have any questions about this drug, please talk with your doctor, nurse, pharmacist, or other health care provider.  Some drugs may have another patient information leaflet. Check with your pharmacist. If you have any questions about this drug, please talk with your doctor, nurse, pharmacist, or other health care provider.  If you think there has been an overdose, call your poison control center or get medical care right away. Be ready to tell or show what was taken, how much, and when it happened.     Consumer Information Use and Disclaimer   This generalized information is a limited summary of diagnosis, treatment, and/or medication information. It is not meant to be comprehensive and should be used as a tool to help the user understand and/or assess potential diagnostic and treatment options. It does NOT include all information about conditions, treatments, medications, side effects, or risks that may apply to a specific patient. It is not intended to be medical advice or a substitute for the medical advice, diagnosis, or treatment of a health care provider based on the health care provider's examination and assessment of a patient's specific and unique circumstances. Patients must speak with a health care provider for complete information about their health, medical questions, and treatment options, including any risks or benefits regarding use of medications. This information does not  endorse any treatments or medications as safe, effective, or approved for treating a specific patient. [employer] and its affiliates disclaim any warranty or liability relating to this information or the use thereof. The use of this information is governed by the Terms of Use, available at https://www.[employer]/en/solutions/lexicomp/about/arvin.  Last Reviewed Date   2020-07-29  Copyright   © 2021 [employer] and its affiliates and/or licensors. All rights reserved.           Follow up in about 3 weeks (around 11/14/2023), or if symptoms worsen or fail to improve.

## 2023-10-24 NOTE — PATIENT INSTRUCTIONS
Lisinopril (lyse [place] pril)   Brand Names: US Prinivil; Qbrelis; Zestril   Brand Names: Brian ACT Lisinopril [DSC]; APO-Lisinopril; Auro-Lisinopril; DOM-Lisinopril; JAMP-Lisinopril; MYLAN-Lisinopril [DSC]; PMS-Lisinopril; Prinivil [DSC]; PRO-Lisinopril-10; PRO-Lisinopril-20; PRO-Lisinopril-5; RAN-Lisinopril; JAYLIN-Lisinopril [DSC]; SANDOZ Lisinopril [DSC]; TEVA-Lisinopril (Type P); TEVA-Lisinopril (Type Z); Zestril   Warning   Do not take if you are pregnant. Use during pregnancy may cause birth defects or loss of the unborn baby. If you get pregnant or plan on getting pregnant while taking this drug, call your doctor right away.     What is this drug used for?   It is used to treat high blood pressure.  It is used to treat heart failure (weak heart).  It is used to help heart function after a heart attack.  It may be given to you for other reasons. Talk with the doctor.     What do I need to tell my doctor BEFORE I take this drug?   For all patients taking this drug:   If you are allergic to this drug; any part of this drug; or any other drugs, foods, or substances. Tell your doctor about the allergy and what signs you had.  If you have ever had a very bad or life-threatening reaction called angioedema. Signs may be swelling of the hands, face, lips, eyes, tongue, or throat; trouble breathing; trouble swallowing; unusual hoarseness.  If you have kidney problems.  If you are taking a drug that has aliskiren in it and you also have diabetes or kidney problems.  If you have taken a drug that has sacubitril in it in the last 36 hours.  If you are breast-feeding. Do not breast-feed while you take this drug.  Children:   If your child is younger than 6 years of age. Do not give this drug to a child younger than 6 years of age.  This is not a list of all drugs or health problems that interact with this drug.  Tell your doctor and pharmacist about all of your drugs (prescription or OTC, natural products, vitamins) and  health problems. You must check to make sure that it is safe for you to take this drug with all of your drugs and health problems. Do not start, stop, or change the dose of any drug without checking with your doctor.  What are some things I need to know or do while I take this drug?   Tell all of your health care providers that you take this drug. This includes your doctors, nurses, pharmacists, and dentists.  Avoid driving and doing other tasks or actions that call for you to be alert until you see how this drug affects you.  To lower the chance of feeling dizzy or passing out, rise slowly if you have been sitting or lying down. Be careful going up and down stairs.  If you have high blood sugar (diabetes), you will need to watch your blood sugar closely.  Check your blood pressure as you have been told.  Have blood work checked as you have been told by the doctor. Talk with the doctor.  If you are taking a salt substitute that has potassium in it, a potassium-sparing diuretic, or a potassium product, talk with your doctor.  If you are on a low-salt or salt-free diet, talk with your doctor.  If you are taking this drug and have high blood pressure, talk with your doctor before using OTC products that may raise blood pressure. These include cough or cold drugs, diet pills, stimulants, non-steroidal anti-inflammatory drugs (NSAIDs) like ibuprofen or naproxen, and some natural products or aids.  Talk with your doctor before you drink alcohol.  Be careful in hot weather or while being active. Drink lots of fluids to stop fluid loss.  Tell your doctor if you have too much sweat, fluid loss, throwing up, or loose stools. This may lead to low blood pressure.  This drug may not work as well to lower blood pressure in Black patients. Sometimes another drug may need to be given with this drug. If you have any questions, talk with the doctor.  A severe and sometimes deadly reaction called angioedema has happened. The chance of  angioedema may be higher in Black patients.  Low white blood cell counts have happened with captopril, a drug like this one. This may lead to more chance of getting an infection. Most of the time, this has happened in people with kidney problems, mainly if they have certain other health problems. Call your doctor right away if you have signs of infection like fever, chills, or sore throat.  If you are 65 or older, use this drug with care. You could have more side effects.     What are some side effects that I need to call my doctor about right away?   WARNING/CAUTION: Even though it may be rare, some people may have very bad and sometimes deadly side effects when taking a drug. Tell your doctor or get medical help right away if you have any of the following signs or symptoms that may be related to a very bad side effect:  Signs of an allergic reaction, like rash; hives; itching; red, swollen, blistered, or peeling skin with or without fever; wheezing; tightness in the chest or throat; trouble breathing, swallowing, or talking; unusual hoarseness; or swelling of the mouth, face, lips, tongue, or throat.  Signs of kidney problems like unable to pass urine, change in how much urine is passed, blood in the urine, or a big weight gain.  Signs of high potassium levels like a heartbeat that does not feel normal; feeling confused; feeling weak, lightheaded, or dizzy; feeling like passing out; numbness or tingling; or shortness of breath.  Very bad dizziness or passing out.  Cough that does not go away.  Very bad belly pain.  Very upset stomach or throwing up.  Chest pain or pressure.  Liver problems have happened with drugs like this one. Sometimes, this has been deadly. Call your doctor right away if you have signs of liver problems like dark urine, feeling tired, not hungry, upset stomach or stomach pain, light-colored stools, throwing up, or yellow skin or eyes.  What are some other side effects of this drug?   All drugs  may cause side effects. However, many people have no side effects or only have minor side effects. Call your doctor or get medical help if any of these side effects or any other side effects bother you or do not go away:  Dizziness or headache.  Cough.  These are not all of the side effects that may occur. If you have questions about side effects, call your doctor. Call your doctor for medical advice about side effects.  You may report side effects to your national health agency.  You may report side effects to the FDA at 1-560.264.2772. You may also report side effects at https://www.fda.gov/medwatch.  How is this drug best taken?   Use this drug as ordered by your doctor. Read all information given to you. Follow all instructions closely.  All products:   Take with or without food.  Take this drug at the same time of day.  Keep taking this drug as you have been told by your doctor or other health care provider, even if you feel well.  Drink lots of noncaffeine liquids unless told to drink less liquid by your doctor.  Tablets:   A liquid (suspension) can be made if you cannot swallow pills. Talk with your doctor or pharmacist.  If a liquid (suspension) is made, shake well before use.  All liquid products:   Measure liquid doses carefully. Use the measuring device that comes with this drug. If there is none, ask the pharmacist for a device to measure this drug.  What do I do if I miss a dose?   Take a missed dose as soon as you think about it.  If it is close to the time for your next dose, skip the missed dose and go back to your normal time.  Do not take 2 doses at the same time or extra doses.     How do I store and/or throw out this drug?   All products:   Store at room temperature in a dry place. Do not store in a bathroom.  Keep lid tightly closed.  Keep all drugs in a safe place. Keep all drugs out of the reach of children and pets.  Throw away unused or  drugs. Do not flush down a toilet or pour down  a drain unless you are told to do so. Check with your pharmacist if you have questions about the best way to throw out drugs. There may be drug take-back programs in your area.  Tablets:   If a liquid (suspension) is made from the tablets, throw away any part not used after 28 days.  Liquid (solution):   Do not freeze.  Protect from heat.  General drug facts   If your symptoms or health problems do not get better or if they become worse, call your doctor.  Do not share your drugs with others and do not take anyone else's drugs.  Some drugs may have another patient information leaflet. If you have any questions about this drug, please talk with your doctor, nurse, pharmacist, or other health care provider.  Some drugs may have another patient information leaflet. Check with your pharmacist. If you have any questions about this drug, please talk with your doctor, nurse, pharmacist, or other health care provider.  If you think there has been an overdose, call your poison control center or get medical care right away. Be ready to tell or show what was taken, how much, and when it happened.     Consumer Information Use and Disclaimer   This generalized information is a limited summary of diagnosis, treatment, and/or medication information. It is not meant to be comprehensive and should be used as a tool to help the user understand and/or assess potential diagnostic and treatment options. It does NOT include all information about conditions, treatments, medications, side effects, or risks that may apply to a specific patient. It is not intended to be medical advice or a substitute for the medical advice, diagnosis, or treatment of a health care provider based on the health care provider's examination and assessment of a patient's specific and unique circumstances. Patients must speak with a health care provider for complete information about their health, medical questions, and treatment options, including any risks or  benefits regarding use of medications. This information does not endorse any treatments or medications as safe, effective, or approved for treating a specific patient. Up[employer], Inc[employer] and its affiliates disclaim any warranty or liability relating to this information or the use thereof. The use of this information is governed by the Terms of Use, available at https://www.[employer]/en/solutions/lexicomp/about/arvin.  Last Reviewed Date   2020-07-29  Copyright   © 2021 Up[employer], Inc[employer] and its affiliates and/or licensors. All rights reserved.

## 2023-10-24 NOTE — ASSESSMENT & PLAN NOTE
There are small cystlike lesions in each kidney. No suspicious renal lesion    is noted. No hydronephrosis is detected. No urinary tract calculus is noted.       We discussed PCKD and impact of HTN and progression. This is followed by Urology. We briefly discussed the topic for education purposes. No further action needed other than managing BP and avoidance of NSAIDs in high doses.

## 2023-10-24 NOTE — ASSESSMENT & PLAN NOTE
The patient is not on diuretics. Need to consider this related to GI issues, however, he would benefit from adrenal workup given his complaint of inability to maintain/gain weight.  Gastrointestinal and renal hydrogen loss is usually accompanied by the loss of chloride and potassium      PLAN    1. Continue K dur once daily.   2. Repeat CMP. PT advised lisinopril can cause hyperkalemia. Would need to monitory for s/s.

## 2023-10-30 ENCOUNTER — PATIENT MESSAGE (OUTPATIENT)
Dept: UROLOGY | Facility: CLINIC | Age: 58
End: 2023-10-30
Payer: COMMERCIAL

## 2023-11-03 ENCOUNTER — TELEPHONE (OUTPATIENT)
Dept: UROLOGY | Facility: CLINIC | Age: 58
End: 2023-11-03
Payer: COMMERCIAL

## 2023-11-15 DIAGNOSIS — E87.6 HYPOKALEMIA: Chronic | ICD-10-CM

## 2023-11-15 DIAGNOSIS — I10 BENIGN HYPERTENSION: ICD-10-CM

## 2023-11-15 RX ORDER — LISINOPRIL 2.5 MG/1
2.5 TABLET ORAL
Qty: 90 TABLET | Refills: 3 | Status: SHIPPED | OUTPATIENT
Start: 2023-11-15 | End: 2023-11-16

## 2023-11-16 ENCOUNTER — OFFICE VISIT (OUTPATIENT)
Dept: FAMILY MEDICINE | Facility: CLINIC | Age: 58
End: 2023-11-16
Payer: COMMERCIAL

## 2023-11-16 VITALS — SYSTOLIC BLOOD PRESSURE: 115 MMHG | HEART RATE: 102 BPM | OXYGEN SATURATION: 97 % | DIASTOLIC BLOOD PRESSURE: 71 MMHG

## 2023-11-16 DIAGNOSIS — E78.2 MIXED HYPERLIPIDEMIA: ICD-10-CM

## 2023-11-16 DIAGNOSIS — Z71.2 ENCOUNTER TO DISCUSS TEST RESULTS: ICD-10-CM

## 2023-11-16 DIAGNOSIS — R03.0 ELEVATED BP WITHOUT DIAGNOSIS OF HYPERTENSION: Primary | ICD-10-CM

## 2023-11-16 PROCEDURE — 1159F MED LIST DOCD IN RCRD: CPT | Mod: CPTII,S$GLB,, | Performed by: NURSE PRACTITIONER

## 2023-11-16 PROCEDURE — 3074F PR MOST RECENT SYSTOLIC BLOOD PRESSURE < 130 MM HG: ICD-10-PCS | Mod: CPTII,S$GLB,, | Performed by: NURSE PRACTITIONER

## 2023-11-16 PROCEDURE — 4010F PR ACE/ARB THEARPY RXD/TAKEN: ICD-10-PCS | Mod: CPTII,S$GLB,, | Performed by: NURSE PRACTITIONER

## 2023-11-16 PROCEDURE — 4010F ACE/ARB THERAPY RXD/TAKEN: CPT | Mod: CPTII,S$GLB,, | Performed by: NURSE PRACTITIONER

## 2023-11-16 PROCEDURE — 1159F PR MEDICATION LIST DOCUMENTED IN MEDICAL RECORD: ICD-10-PCS | Mod: CPTII,S$GLB,, | Performed by: NURSE PRACTITIONER

## 2023-11-16 PROCEDURE — 99213 OFFICE O/P EST LOW 20 MIN: CPT | Mod: S$GLB,,, | Performed by: NURSE PRACTITIONER

## 2023-11-16 PROCEDURE — 3074F SYST BP LT 130 MM HG: CPT | Mod: CPTII,S$GLB,, | Performed by: NURSE PRACTITIONER

## 2023-11-16 PROCEDURE — 3078F DIAST BP <80 MM HG: CPT | Mod: CPTII,S$GLB,, | Performed by: NURSE PRACTITIONER

## 2023-11-16 PROCEDURE — 99213 PR OFFICE/OUTPT VISIT, EST, LEVL III, 20-29 MIN: ICD-10-PCS | Mod: S$GLB,,, | Performed by: NURSE PRACTITIONER

## 2023-11-16 PROCEDURE — 3078F PR MOST RECENT DIASTOLIC BLOOD PRESSURE < 80 MM HG: ICD-10-PCS | Mod: CPTII,S$GLB,, | Performed by: NURSE PRACTITIONER

## 2023-11-16 RX ORDER — ATORVASTATIN CALCIUM 10 MG/1
10 TABLET, FILM COATED ORAL DAILY
Qty: 90 TABLET | Refills: 3 | Status: SHIPPED | OUTPATIENT
Start: 2023-11-16 | End: 2024-11-15

## 2023-11-16 NOTE — ASSESSMENT & PLAN NOTE
Transient and related to home BP cuff.  Advised patient to get a new cuff to monitor his BP.  Prior readings wnl.  No longer on med. I do not recommend continuation of any med r/t HTN. Recommend home monitoring.

## 2023-11-16 NOTE — PROGRESS NOTES
Subjective:      Patient ID: Jose Acosta is a 58 y.o. male.    Chief Complaint: Results      58-year-old male         I am seeing this patient of NP Bel Bowser's while out on medical leave.       During the previous visit, the patient was concerned about elevated blood pressure readings.  He elected to start medication.  He believes that his blood pressure cuff is inaccurate as his readings have been perfect in the past.  He stopped the lisinopril.  No side effects reported while on medication.      The patient had labs done prior to arrival here to discuss them.  No acute issues reported at this time.            Past Medical History:   Diagnosis Date    Allergy     BPH (benign prostatic hyperplasia)     Chronic constipation     Hypochloremia       Social History     Socioeconomic History    Marital status:    Occupational History    Occupation:    Tobacco Use    Smoking status: Never    Smokeless tobacco: Never   Substance and Sexual Activity    Alcohol use: Not Currently     Comment: rarely    Drug use: Not Currently      Family History   Problem Relation Age of Onset    No Known Problems Mother     Cancer Father         ROS:   Review of Systems   Constitutional:  Negative for activity change, appetite change, fever and unexpected weight change.   HENT:  Negative for congestion and dental problem.    Eyes:  Negative for photophobia and visual disturbance.   Respiratory:  Negative for chest tightness and shortness of breath.    Cardiovascular:  Negative for chest pain, palpitations and leg swelling.   Gastrointestinal:  Positive for constipation (chronic). Negative for abdominal distention, abdominal pain, diarrhea, nausea and vomiting.   Genitourinary:  Negative for decreased urine volume, difficulty urinating, dysuria, enuresis, flank pain, frequency, genital sores, hematuria, penile discharge, penile pain, penile swelling, scrotal swelling, testicular pain and urgency.   Musculoskeletal:   Negative for back pain and neck pain.   Skin:  Negative for color change and rash.   Neurological:  Negative for dizziness, syncope, weakness, light-headedness and headaches.   Hematological:  Negative for adenopathy.   Psychiatric/Behavioral:  Negative for agitation, behavioral problems and confusion.      Objective:   Physical Exam  Vitals and nursing note reviewed.   Constitutional:       General: He is awake. He is not in acute distress.     Appearance: Normal appearance. He is well-developed and well-groomed. He is not ill-appearing.   HENT:      Head: Normocephalic and atraumatic.      Mouth/Throat:      Mouth: Mucous membranes are moist.   Eyes:      General: No scleral icterus.     Conjunctiva/sclera: Conjunctivae normal.   Neck:      Vascular: No carotid bruit.      Trachea: Trachea normal.   Cardiovascular:      Rate and Rhythm: Normal rate and regular rhythm.      Pulses: Normal pulses.      Heart sounds: Normal heart sounds. No murmur heard.     No friction rub. No gallop.   Pulmonary:      Effort: Pulmonary effort is normal.      Breath sounds: Normal breath sounds. No wheezing or rales.   Abdominal:      General: Abdomen is flat.   Musculoskeletal:         General: Normal range of motion.      Cervical back: Normal range of motion and neck supple.      Right lower leg: No edema.      Left lower leg: No edema.   Skin:     General: Skin is warm and dry.      Coloration: Skin is not jaundiced.      Findings: No rash.   Neurological:      Mental Status: He is alert and oriented to person, place, and time. Mental status is at baseline.   Psychiatric:         Mood and Affect: Mood normal.         Speech: Speech normal.         Behavior: Behavior normal. Behavior is cooperative.       Assessment:     1. Elevated BP without diagnosis of hypertension    2. Mixed hyperlipidemia    3. Encounter to discuss test results      No images are attached to the encounter.   Plan:     Problem List Items Addressed This  Visit          Cardiac/Vascular    Elevated BP without diagnosis of hypertension - Primary    Current Assessment & Plan       Transient and related to home BP cuff.  Advised patient to get a new cuff to monitor his BP.  Prior readings wnl.  No longer on med. I do not recommend continuation of any med r/t HTN. Recommend home monitoring.                  Mixed hyperlipidemia    Current Assessment & Plan     Chol and LDL continue to be elevated. He would like to pursue statins.         PLAN    Start Lipitor 10 mg  See AVS for LDL diet.   Recommend he follow up with Bel Bowser NP in 1 month to recheck lipids.          Relevant Medications    atorvastatin (LIPITOR) 10 MG tablet     Other Visit Diagnoses       Encounter to discuss test results        All results discussed.          Procedures     Office Visit on 10/18/2023   Component Date Value Ref Range Status    Color, POC UA 10/18/2023 Yellow  Yellow, Straw, Colorless Final    Clarity, POC UA 10/18/2023 Clear  Clear Final    Glucose, POC UA 10/18/2023 Negative  Negative Final    Bilirubin, POC UA 10/18/2023 Negative  Negative Final    Ketones, POC UA 10/18/2023 Negative  Negative Final    Spec Grav POC UA 10/18/2023 1.025  1.005 - 1.030 Final    Blood, POC UA 10/18/2023 Small (A)  Negative Final    pH, POC UA 10/18/2023 6.5  5.0 - 8.0 Final    Protein, POC UA 10/18/2023 Negative  Negative Final    Urobilinogen, POC UA 10/18/2023 0.2  <=1.0 Final    Nitrite, POC UA 10/18/2023 Negative  Negative Final    WBC, POC UA 10/18/2023 Negative  Negative Final        No results found in the last 30 days.       Duration of encounter:  minutes  This includes face-to-face time and non face-to-face time preparing to see the patient (eg, review of tests), obtaining and/or reviewing separately obtained history, documenting clinical information in the electronic or other health record, independently interpreting resultsand communicating results to the patient/family/caregiver, or  care coordination    All diagnostic data (labs/imaging) was reviewed with the patient and/or family member in the room.  All questions were answered to their liking. The patient and/or family member voiced understanding of all instructions provided. Expectations regarding follow up and treatment plan were voiced and confirmed prior to departure. The patient was given orders/instructions at the end of the visit for reference. They were instructed to notify my office if they have not been contacted for imaging/referrals/labs/results in 1-2 weeks. They voiced understanding of all of the above.     Follow up:     Patient Instructions   GUIDELINES FOR LOW CHOLESTEROL, LOW-TRIGLYCERIDE DIETS  FOODS TO AVOID    MEATS & FISH Marbled beef, pork, ortega, sausage, and other pork products; fatty fowl (duck, goose); skin  and fat of turkey and chicken; processed meats; luncheon meats (salami, bologna); hot dogs  and fast-food hamburgers (they're loaded with fat); organ meats (kidneys, liver); canned fish  packed in oil.    EGGS Limit egg yolks to two per week.    FRUITS Coconuts (rich in saturated fats).  VEGETABLES Starchy vegetables (potatoes, corn, lima beans, dried peas. beans) may be used only if  substitutes for a serving of bread or cereal. (Baked potato skin, however, is desirable for its  fiber content.)    BEAN'S Commercial baked beans with sugar and or pork added.  NUTS Limit peanuts. Walnuts and almonds are more preferable type nuts.  BREADS & GRAINS Any baked goods with shortening and/or sugar. Commercial mixes with dried eggs and  whole milk. Avoid sweet rolls, doughnuts, breakfast pastries (Georgian). and sweetened  packaged cereals (the added sugar converts readily to triglycerides).    MILK PRODUCTS Whole milk and whole milk packaged goods; cream; ice cream: whole-milk puddings,  yogurt, or cheeses; nondairy cream substitutes.    FATS & OILS Butter, lard, animal fats, ortega drippings, gravies, cream sauces as well  "as palm and coconut  oils. All these are high in saturated fats. Examine labels on "cholesterol free-products for  hydrogenated fats" (These are oils that have been hardened into solids and in the process  have become saturated.)    DESSERTS Fried snack foods like potato chips; chocolate; candies in general; jams; jellies; &  & SNACKS syrups; whole-milk puddings; ice cream and milk sherbets; hydrogenated peanut butter.  BEVERAGES Sugared fruit juices and soft drinks; cocoa made with whole milk and or sugar. When using  alcohol (1/2 oz liquor, 12 oz beer, 5 oz dry table wine per serving "), one serving may be  substituted for one bread or cereal serving (limit: two servings of alcohol per day).    SPECIAL NOTES  1. Remember that even non-limited foods should be used in moderation.  2. While on a cholesterol-lowering diet, be sure to avoid animal fats and marbled meats.  3. While on a triglyceride lowering diet, be sure to avoid sweets and to control the amount  of carbohydrates you eat (starchy foods such as flour, bread, and potatoes). Buy a good  low-fat cookbook, such as the one published by the American Heart Association.  4. Consult your physician if you have any questions.  SEE REVERSE SIDE FOR FOODS TO USE  My docs/Clinic/Guidelines for Low........(Rosa Lew) Revised 10-26-11  GUIDELINES FOR LOW CHOLESTEROL, LOW-TRIGLYCERIDE DIETS  FOODS TO USE  MEAT & FISH Choose lean meats (chicken, turkey, veal, and nonfatty cuts of beef with excess fat trimmed;  one serving = 3 oz of cooked meat). Also, fresh or frozen fish, canned fish packed in water,  and shellfish (lobster, crabs, shrimp, oysters). Limit use to no more than one sensing of one  of these per week. Shellfish are high in cholesterol but low in saturated fat and should be  used sparingly. Meats and fish should be broiled (pan or oven) or baked on a rack.  EGGS Egg substitutes and egg whites (use freely). Egg yolks (limit two per week).  FRUITS Eat three " servings of fresh fruit per day (1 serving = ½ cup). Be sure to have at least one  citrus fruit daily. Frozen or canned fruit with no sugar or syrup added may be used.  VEGETABLES Most vegetables are not limited (see reverse side). One dark green (string beans, escarole) or  one deep yellow (squash) vegetable is recommended daily. Cauliflower, broccoli, and  celery, as well as potato skins are recommended for their fiber content. (Fiber is associated  with cholesterol reduction.) It is preferable to steam vegetables, but they may be boiled,  strained, or braised with polyunsaturated vegetable oil (see below).  BEANS Dried peas or beans (1 serving = ½ cup) may be used as a bread substitute.  NUTS Almonds, walnuts, and peanuts may be used sparingly (1 serving = 1 tablespoonful). Use  pumpkin, sesame, or sunflower seeds.  BREAD & GRAINS One roll or one slice of whole grain or enriched bread may be used, or three soda crackers  or four pieces of jessee toast as a substitute. Spaghetti, rice or noodles (½ cup) or ½ large  ear of corn may be used as a bread substitute. In preparing these foods, do not use butter or  shortening, use soft margarine. Also use egg and sugar substitutes. Choose high fiber grains,  such as oats and whole wheat.  CEREALS Use ½ cup of hot cereal or ¾ cup of cold cereal per day. Add a sugar substitute if desired,  with 99% fat-free or skim milk.  MILK PRODUCTS Always use 99% fat free or skim milk, dairy products such as low fat cheeses (farmer's,  uncreamed diet cottage), low fat yogurt, and powdered skim milk.  FATS & OILS Use soft (not stick) margarine; vegetable oils that are high in polyunsaturated fats (such as  safflower, sunflower, soybean, corn, and cottonseed). Always refrigerate meat drippings to  harden the fat and remove it before preparing gravies.  DESSERT Limit to two servings per day; substitute each serving for a bread/cereal serving: ice milk,  & SNACKS water sherbet (¼ cup);  unflavored gelatin or gelatin flavored with sugar substitute (1/3  cup); pudding prepared with skim milk (½ cup); egg white souffles: unbuttered popcorn (1  1/2 cups). Substitute carob for chocolate.  BEVERAGES Fresh fruit juices (limit 4 oz per day); black coffee, plain or herbal teas; soft drinks with  sugar substitutes; club soda, preferably salt free; cocoa made with skim milk; or nonfat dried  milk and water (sugar substitute added if desired); clear broth. Alcohol: limit two servings  per day (see reverse side).  MISCELLANEOUS You may use the following freely: vinegar, spices, herbs, nonfat bouillon, mustard.  Worcestershire sauce, soy sauce, flavoring essence.       Follow up if symptoms worsen or fail to improve, for Follow up with PCP in 1 month for repeat Lipid test. .

## 2023-11-16 NOTE — ASSESSMENT & PLAN NOTE
Chol and LDL continue to be elevated. He would like to pursue statins.         PLAN    Start Lipitor 10 mg  See AVS for LDL diet.   Recommend he follow up with Bel Bowser NP in 1 month to recheck lipids.

## 2023-11-29 ENCOUNTER — CLINICAL SUPPORT (OUTPATIENT)
Dept: UROLOGY | Facility: CLINIC | Age: 58
End: 2023-11-29
Payer: COMMERCIAL

## 2023-11-29 DIAGNOSIS — R31.29 HEMATURIA, MICROSCOPIC: Primary | ICD-10-CM

## 2023-11-29 LAB
BILIRUBIN, UA POC OHS: NEGATIVE
BLOOD, UA POC OHS: ABNORMAL
CLARITY, UA POC OHS: CLEAR
COLOR, UA POC OHS: YELLOW
GLUCOSE, UA POC OHS: NEGATIVE
KETONES, UA POC OHS: NEGATIVE
LEUKOCYTES, UA POC OHS: NEGATIVE
NITRITE, UA POC OHS: NEGATIVE
PH, UA POC OHS: 5.5
PROTEIN, UA POC OHS: NEGATIVE
SPECIFIC GRAVITY, UA POC OHS: 1.01
UROBILINOGEN, UA POC OHS: 0.2

## 2023-11-29 PROCEDURE — 81003 URINALYSIS AUTO W/O SCOPE: CPT | Mod: QW,S$GLB,, | Performed by: NURSE PRACTITIONER

## 2023-11-29 PROCEDURE — 81003 POCT URINALYSIS(INSTRUMENT): ICD-10-PCS | Mod: QW,S$GLB,, | Performed by: NURSE PRACTITIONER

## 2024-03-05 DIAGNOSIS — R39.198 DIFFICULTY VOIDING: ICD-10-CM

## 2024-03-05 RX ORDER — TAMSULOSIN HYDROCHLORIDE 0.4 MG/1
1 CAPSULE ORAL
Qty: 90 CAPSULE | Refills: 0 | Status: SHIPPED | OUTPATIENT
Start: 2024-03-05 | End: 2024-06-07

## 2024-03-06 ENCOUNTER — OFFICE VISIT (OUTPATIENT)
Dept: FAMILY MEDICINE | Facility: CLINIC | Age: 59
End: 2024-03-06
Payer: COMMERCIAL

## 2024-03-06 VITALS
OXYGEN SATURATION: 98 % | RESPIRATION RATE: 18 BRPM | HEART RATE: 83 BPM | HEIGHT: 71 IN | BODY MASS INDEX: 19.04 KG/M2 | SYSTOLIC BLOOD PRESSURE: 113 MMHG | WEIGHT: 136 LBS | DIASTOLIC BLOOD PRESSURE: 77 MMHG

## 2024-03-06 DIAGNOSIS — R73.9 HYPERGLYCEMIA: ICD-10-CM

## 2024-03-06 DIAGNOSIS — Z23 NEED FOR SHINGLES VACCINE: ICD-10-CM

## 2024-03-06 DIAGNOSIS — Z12.5 PROSTATE CANCER SCREENING: ICD-10-CM

## 2024-03-06 DIAGNOSIS — Z00.00 ROUTINE ADULT HEALTH MAINTENANCE: Primary | ICD-10-CM

## 2024-03-06 PROCEDURE — 1160F RVW MEDS BY RX/DR IN RCRD: CPT | Mod: CPTII,S$GLB,, | Performed by: NURSE PRACTITIONER

## 2024-03-06 PROCEDURE — 99396 PREV VISIT EST AGE 40-64: CPT | Mod: S$GLB,,, | Performed by: NURSE PRACTITIONER

## 2024-03-06 PROCEDURE — 3074F SYST BP LT 130 MM HG: CPT | Mod: CPTII,S$GLB,, | Performed by: NURSE PRACTITIONER

## 2024-03-06 PROCEDURE — 1159F MED LIST DOCD IN RCRD: CPT | Mod: CPTII,S$GLB,, | Performed by: NURSE PRACTITIONER

## 2024-03-06 PROCEDURE — 3008F BODY MASS INDEX DOCD: CPT | Mod: CPTII,S$GLB,, | Performed by: NURSE PRACTITIONER

## 2024-03-06 PROCEDURE — 3078F DIAST BP <80 MM HG: CPT | Mod: CPTII,S$GLB,, | Performed by: NURSE PRACTITIONER

## 2024-03-06 RX ORDER — ZOSTER VACCINE RECOMBINANT, ADJUVANTED 50 MCG/0.5
KIT INTRAMUSCULAR
Qty: 1 EACH | Refills: 1 | Status: SHIPPED | OUTPATIENT
Start: 2024-03-06

## 2024-03-06 NOTE — PROGRESS NOTES
Subjective:       Patient ID: Jose Acosta is a 58 y.o. male.    Chief Complaint: Annual Exam    Here for annual wellness exam. He is due for annual lipid check, we have been monitoring his elevated cholesterol and LDL. Last ASCVD score 4.8%; He is on daily Atorvastatin 10 mg hs. He is UTD with colorectal screen; next screen due 8/2026; UTD w/ Tdap-- given in 2023.           Review of Systems   Constitutional:  Negative for chills, fatigue and fever.   Respiratory:  Negative for cough, shortness of breath and wheezing.    Cardiovascular:  Negative for chest pain and palpitations.   Gastrointestinal:  Negative for abdominal pain, nausea and vomiting.   Musculoskeletal:  Negative for myalgias.   Neurological:  Negative for dizziness, light-headedness and headaches.           Past Medical History:  Past Medical History:   Diagnosis Date    Allergy     BPH (benign prostatic hyperplasia)     Chronic constipation     Hypochloremia       History reviewed. No pertinent surgical history.   Review of patient's allergies indicates:   Allergen Reactions    Codeine       Current Outpatient Medications   Medication Sig Dispense Refill    atorvastatin (LIPITOR) 10 MG tablet Take 1 tablet (10 mg total) by mouth once daily. 90 tablet 3    betamethasone valerate 0.1% (VALISONE) 0.1 % Crea Apply topically 2 (two) times daily. 45 g 0    celecoxib (CELEBREX) 100 MG capsule Take 1 capsule (100 mg total) by mouth 2 (two) times daily. 60 capsule 5    cyclobenzaprine (FLEXERIL) 10 MG tablet Take 1 tablet (10 mg total) by mouth 2 (two) times daily as needed for Muscle spasms. 60 tablet 5    levocetirizine (XYZAL) 5 MG tablet levocetirizine 5 mg tablet   TAKE 1 TABLET BY MOUTH EVERY DAY IN THE EVENING      linaCLOtide (LINZESS) 290 mcg Cap capsule Take 1 capsule (290 mcg total) by mouth once daily. 90 capsule 3    potassium chloride SA (K-DUR,KLOR-CON) 20 MEQ tablet TAKE 1 TABLET BY MOUTH ONCE DAILY 90 tablet 3    tamsulosin (FLOMAX) 0.4 mg  Cap TAKE 1 CAPSULE BY MOUTH EVERY DAY 90 capsule 0    varicella-zoster gE-AS01B, PF, (SHINGRIX, PF,) 50 mcg/0.5 mL injection Inject 0.5 mL IM now; repeat x 1 dose in 8 weeks 1 each 1     No current facility-administered medications for this visit.     Social History     Socioeconomic History    Marital status:    Occupational History    Occupation:    Tobacco Use    Smoking status: Never    Smokeless tobacco: Never   Substance and Sexual Activity    Alcohol use: Not Currently     Comment: rarely    Drug use: Not Currently     Social Determinants of Health     Financial Resource Strain: Low Risk  (3/6/2024)    Overall Financial Resource Strain (CARDIA)     Difficulty of Paying Living Expenses: Not very hard   Food Insecurity: No Food Insecurity (3/6/2024)    Hunger Vital Sign     Worried About Running Out of Food in the Last Year: Never true     Ran Out of Food in the Last Year: Never true   Transportation Needs: No Transportation Needs (3/6/2024)    PRAPARE - Transportation     Lack of Transportation (Medical): No     Lack of Transportation (Non-Medical): No   Physical Activity: Sufficiently Active (3/6/2024)    Exercise Vital Sign     Days of Exercise per Week: 4 days     Minutes of Exercise per Session: 40 min   Stress: No Stress Concern Present (3/6/2024)    [employer] - Occupational Stress Questionnaire     Feeling of Stress : Not at all   Social Connections: Unknown (3/6/2024)    Social Connection and Isolation Panel [NHANES]     Frequency of Communication with Friends and Family: Once a week     Frequency of Social Gatherings with Friends and Family: Once a week     Active Member of Clubs or Organizations: Yes     Attends Club or Organization Meetings: 1 to 4 times per year     Marital Status:    Housing Stability: Low Risk  (3/6/2024)    Housing Stability Vital Sign     Unable to Pay for Housing in the Last Year: No     Number of Places Lived in the  Last Year: 1     Unstable Housing in the Last Year: No      Family History   Problem Relation Age of Onset    No Known Problems Mother     Cancer Father         Objective:      Physical Exam  Constitutional:       Appearance: He is well-developed.   HENT:      Head: Normocephalic and atraumatic.      Mouth/Throat:      Mouth: Mucous membranes are moist.      Pharynx: Oropharynx is clear.   Eyes:      General: No scleral icterus.     Conjunctiva/sclera: Conjunctivae normal.   Neck:      Trachea: Trachea normal.   Cardiovascular:      Rate and Rhythm: Normal rate and regular rhythm.   Pulmonary:      Effort: Pulmonary effort is normal.      Breath sounds: Normal breath sounds.   Musculoskeletal:      Cervical back: Normal range of motion and neck supple.   Neurological:      Mental Status: He is alert and oriented to person, place, and time.   Psychiatric:         Mood and Affect: Mood normal.         Speech: Speech normal.         Behavior: Behavior normal.         Assessment:     1. Routine adult health maintenance    2. Prediabetes/diabetes screen    3. Need for shingles vaccine    4. Prostate cancer screening      Plan:       PROBLEM LIST     Routine adult health maintenance  -     Lipid Panel; Future; Expected date: 03/06/2024  -     TSH w/reflex to FT4; Future; Expected date: 03/06/2024  -     CBC Auto Differential; Future; Expected date: 03/06/2024  -     Comprehensive Metabolic Panel; Future; Expected date: 03/06/2024    Prediabetes/diabetes screen  -     PSA, Screening; Future; Expected date: 03/06/2024    Need for shingles vaccine  -     varicella-zoster gE-AS01B, PF, (SHINGRIX, PF,) 50 mcg/0.5 mL injection; Inject 0.5 mL IM now; repeat x 1 dose in 8 weeks  Dispense: 1 each; Refill: 1    Prostate cancer screening  -     Hemoglobin A1C; Future; Expected date: 03/06/2024        UTD with colorectal screen and Tdap; encouraged him to update Shingrix vaccine series.    Obtaining annual labs including FLP. Will  call w/ results.

## 2024-04-18 ENCOUNTER — OFFICE VISIT (OUTPATIENT)
Dept: UROLOGY | Facility: CLINIC | Age: 59
End: 2024-04-18
Payer: COMMERCIAL

## 2024-04-18 VITALS
WEIGHT: 136 LBS | DIASTOLIC BLOOD PRESSURE: 78 MMHG | HEIGHT: 71 IN | BODY MASS INDEX: 19.04 KG/M2 | HEART RATE: 71 BPM | OXYGEN SATURATION: 99 % | TEMPERATURE: 98 F | SYSTOLIC BLOOD PRESSURE: 120 MMHG

## 2024-04-18 DIAGNOSIS — N13.8 BPH WITH URINARY OBSTRUCTION: Primary | ICD-10-CM

## 2024-04-18 DIAGNOSIS — N40.1 BPH WITH URINARY OBSTRUCTION: Primary | ICD-10-CM

## 2024-04-18 DIAGNOSIS — N41.0 ACUTE PROSTATITIS: ICD-10-CM

## 2024-04-18 DIAGNOSIS — R31.29 HEMATURIA, MICROSCOPIC: ICD-10-CM

## 2024-04-18 LAB
ABS NRBC COUNT: 0 X 10 3/UL (ref 0–0.01)
ABSOLUTE BASOPHIL: 0.04 X 10 3/UL (ref 0–0.22)
ABSOLUTE EOSINOPHIL: 0.44 X 10 3/UL (ref 0.04–0.54)
ABSOLUTE IMMATURE GRAN: 0.01 X 10 3/UL (ref 0–0.04)
ABSOLUTE LYMPHOCYTE: 1.63 X 10 3/UL (ref 0.86–4.75)
ABSOLUTE MONOCYTE: 0.49 X 10 3/UL (ref 0.22–1.08)
ALBUMIN SERPL-MCNC: 4.2 G/DL (ref 3.5–5.2)
ALBUMIN/GLOB SERPL ELPH: 1.6 {RATIO} (ref 1–2.7)
ALP ISOS SERPL LEV INH-CCNC: 84 U/L (ref 40–130)
ALT (SGPT): 18 U/L (ref 0–41)
ANION GAP SERPL CALC-SCNC: 9 MMOL/L (ref 8–17)
AST SERPL-CCNC: 16 U/L (ref 0–40)
BASOPHILS NFR BLD: 0.8 % (ref 0.2–1.2)
BILIRUBIN, TOTAL: 0.56 MG/DL (ref 0–1.2)
BILIRUBIN, UA POC OHS: NEGATIVE
BLOOD, UA POC OHS: ABNORMAL
BUN/CREAT SERPL: 14.2 (ref 6–20)
CALCIUM SERPL-MCNC: 9.2 MG/DL (ref 8.6–10.2)
CARBON DIOXIDE, CO2: 29 MMOL/L (ref 22–29)
CHLORIDE: 103 MMOL/L (ref 98–107)
CHOLEST SERPL-MSCNC: 205 MG/DL (ref 100–200)
CLARITY, UA POC OHS: CLEAR
COLOR, UA POC OHS: YELLOW
CREAT SERPL-MCNC: 0.99 MG/DL (ref 0.7–1.2)
EOSINOPHIL NFR BLD: 8.9 % (ref 0.7–7)
ESTIMATED AVERAGE GLUCOSE: 113 MG/DL
GFR ESTIMATION: 89.4 ML/MIN/1.73M2
GLOBULIN: 2.7 G/DL (ref 1.5–4.5)
GLUCOSE, UA POC OHS: NEGATIVE
GLUCOSE: 101 MG/DL (ref 74–106)
HBA1C MFR BLD: 5.5 % (ref 4–6)
HCT VFR BLD AUTO: 49.1 % (ref 42–52)
HDLC SERPL-MCNC: 54 MG/DL
HGB BLD-MCNC: 15.9 G/DL (ref 14–18)
IMMATURE GRANULOCYTES: 0.2 % (ref 0–0.5)
KETONES, UA POC OHS: NEGATIVE
LDL/HDL RATIO: 2.5 (ref 1–3)
LDLC SERPL CALC-MCNC: 134.8 MG/DL (ref 0–100)
LEUKOCYTES, UA POC OHS: NEGATIVE
LYMPHOCYTES NFR BLD: 32.8 % (ref 19.3–53.1)
MCH RBC QN AUTO: 29.8 PG (ref 27–32)
MCHC RBC AUTO-ENTMCNC: 32.4 G/DL (ref 32–36)
MCV RBC AUTO: 92.1 FL (ref 80–94)
MONOCYTES NFR BLD: 9.9 % (ref 4.7–12.5)
NEUTROPHILS # BLD AUTO: 2.36 X 10 3/UL (ref 2.15–7.56)
NEUTROPHILS NFR BLD: 47.4 % (ref 34–71.1)
NITRITE, UA POC OHS: NEGATIVE
NUCLEATED RED BLOOD CELLS: 0 /100 WBC (ref 0–0.2)
PH, UA POC OHS: 7.5
PLATELET # BLD AUTO: 246 X 10 3/UL (ref 135–400)
POTASSIUM: 4.3 MMOL/L (ref 3.5–5.1)
PROT SNV-MCNC: 6.9 G/DL (ref 6.4–8.3)
PROTEIN, UA POC OHS: NEGATIVE
RBC # BLD AUTO: 5.33 X 10 6/UL (ref 4.7–6.1)
RDW-SD: 44.7 FL (ref 37–54)
SODIUM: 141 MMOL/L (ref 136–145)
SPECIFIC GRAVITY, UA POC OHS: 1.01
TRIGL SERPL-MCNC: 81 MG/DL (ref 0–150)
TSH W/REFLEX TO FT4: 1.51 UIU/ML (ref 0.27–4.2)
UREA NITROGEN (BUN): 14.1 MG/DL (ref 6–20)
UROBILINOGEN, UA POC OHS: 0.2
WBC # BLD: 4.97 X 10 3/UL (ref 4.3–10.8)

## 2024-04-18 PROCEDURE — 3008F BODY MASS INDEX DOCD: CPT | Mod: CPTII,S$GLB,, | Performed by: NURSE PRACTITIONER

## 2024-04-18 PROCEDURE — 81003 URINALYSIS AUTO W/O SCOPE: CPT | Mod: QW,S$GLB,, | Performed by: NURSE PRACTITIONER

## 2024-04-18 PROCEDURE — 3074F SYST BP LT 130 MM HG: CPT | Mod: CPTII,S$GLB,, | Performed by: NURSE PRACTITIONER

## 2024-04-18 PROCEDURE — 1159F MED LIST DOCD IN RCRD: CPT | Mod: CPTII,S$GLB,, | Performed by: NURSE PRACTITIONER

## 2024-04-18 PROCEDURE — 1160F RVW MEDS BY RX/DR IN RCRD: CPT | Mod: CPTII,S$GLB,, | Performed by: NURSE PRACTITIONER

## 2024-04-18 PROCEDURE — 3078F DIAST BP <80 MM HG: CPT | Mod: CPTII,S$GLB,, | Performed by: NURSE PRACTITIONER

## 2024-04-18 PROCEDURE — 99214 OFFICE O/P EST MOD 30 MIN: CPT | Mod: S$GLB,,, | Performed by: NURSE PRACTITIONER

## 2024-04-18 RX ORDER — CEFDINIR 300 MG/1
300 CAPSULE ORAL 2 TIMES DAILY
Qty: 60 CAPSULE | Refills: 0 | Status: SHIPPED | OUTPATIENT
Start: 2024-04-18 | End: 2024-05-18

## 2024-04-18 NOTE — PROGRESS NOTES
Subjective:       Patient ID: Jose Acosta is a 58 y.o. male.    Chief Complaint: Follow-up      HPI: 58-year-old male, established patient, presents for 6 month visit.  Last seen by willow Joyce NP.    Patient's history BPH with obstruction.  He is currently on Flomax 0.4 mg daily.    He has a history of microscopic hematuria with a negative workup consisting of a cysto in 2022 and a CT in 2023.    Patient was treated for acute prostatitis at last visit.  He was on Bactrim DS for 30 days.      Patient is complaining of some frequency and urgency.  He is denying any pain or burning with urination.  Denies any odor urine.  Denies any fever or body aches.  Denies any blood in urine.    He has decreased his use of soda.  No other urinary complaints at this time.         Past Medical History:   Past Medical History:   Diagnosis Date    Allergy     BPH (benign prostatic hyperplasia)     Chronic constipation     Hypochloremia        Past Surgical Historical: No past surgical history on file.     Medications:   Medication List with Changes/Refills   New Medications    CEFDINIR (OMNICEF) 300 MG CAPSULE    Take 1 capsule (300 mg total) by mouth 2 (two) times daily.   Current Medications    ATORVASTATIN (LIPITOR) 10 MG TABLET    Take 1 tablet (10 mg total) by mouth once daily.    BETAMETHASONE VALERATE 0.1% (VALISONE) 0.1 % CREA    Apply topically 2 (two) times daily.    CELECOXIB (CELEBREX) 100 MG CAPSULE    Take 1 capsule (100 mg total) by mouth 2 (two) times daily.    CYCLOBENZAPRINE (FLEXERIL) 10 MG TABLET    Take 1 tablet (10 mg total) by mouth 2 (two) times daily as needed for Muscle spasms.    LEVOCETIRIZINE (XYZAL) 5 MG TABLET    levocetirizine 5 mg tablet   TAKE 1 TABLET BY MOUTH EVERY DAY IN THE EVENING    LINACLOTIDE (LINZESS) 290 MCG CAP CAPSULE    Take 1 capsule (290 mcg total) by mouth once daily.    POTASSIUM CHLORIDE SA (K-DUR,KLOR-CON) 20 MEQ TABLET    TAKE 1 TABLET BY MOUTH ONCE DAILY    TAMSULOSIN  (FLOMAX) 0.4 MG CAP    TAKE 1 CAPSULE BY MOUTH EVERY DAY    VARICELLA-ZOSTER GE-AS01B, PF, (SHINGRIX, PF,) 50 MCG/0.5 ML INJECTION    Inject 0.5 mL IM now; repeat x 1 dose in 8 weeks        Past Social History:   Social History     Socioeconomic History    Marital status:    Occupational History    Occupation:    Tobacco Use    Smoking status: Never     Passive exposure: Never    Smokeless tobacco: Never   Substance and Sexual Activity    Alcohol use: Not Currently     Comment: rarely    Drug use: Not Currently     Social Determinants of Health     Financial Resource Strain: Low Risk  (3/6/2024)    Overall Financial Resource Strain (CARDIA)     Difficulty of Paying Living Expenses: Not very hard   Food Insecurity: No Food Insecurity (3/6/2024)    Hunger Vital Sign     Worried About Running Out of Food in the Last Year: Never true     Ran Out of Food in the Last Year: Never true   Transportation Needs: No Transportation Needs (3/6/2024)    PRAPARE - Transportation     Lack of Transportation (Medical): No     Lack of Transportation (Non-Medical): No   Physical Activity: Sufficiently Active (3/6/2024)    Exercise Vital Sign     Days of Exercise per Week: 4 days     Minutes of Exercise per Session: 40 min   Stress: No Stress Concern Present (3/6/2024)    [employer] - Occupational Stress Questionnaire     Feeling of Stress : Not at all   Social Connections: Unknown (3/6/2024)    Social Connection and Isolation Panel [NHANES]     Frequency of Communication with Friends and Family: Once a week     Frequency of Social Gatherings with Friends and Family: Once a week     Active Member of Clubs or Organizations: Yes     Attends Club or Organization Meetings: 1 to 4 times per year     Marital Status:    Housing Stability: Low Risk  (3/6/2024)    Housing Stability Vital Sign     Unable to Pay for Housing in the Last Year: No     Number of Places Lived in the Last Year: 1      Unstable Housing in the Last Year: No       Allergies:   Review of patient's allergies indicates:   Allergen Reactions    Codeine         Family History:   Family History   Problem Relation Name Age of Onset    No Known Problems Mother      Cancer Father          Review of Systems:  Review of Systems   Constitutional:  Negative for activity change and appetite change.   HENT:  Negative for congestion and dental problem.    Eyes:  Negative for visual disturbance.   Respiratory:  Negative for chest tightness and shortness of breath.    Cardiovascular:  Negative for chest pain.   Gastrointestinal:  Negative for abdominal distention and abdominal pain.   Genitourinary:  Positive for frequency and urgency. Negative for decreased urine volume, difficulty urinating, dysuria, enuresis, flank pain, genital sores, hematuria, penile discharge, penile pain, penile swelling, scrotal swelling and testicular pain.   Musculoskeletal:  Negative for back pain and neck pain.   Skin:  Negative for color change.   Neurological:  Negative for dizziness.   Hematological:  Negative for adenopathy.   Psychiatric/Behavioral:  Negative for agitation, behavioral problems and confusion.        Physical Exam:  Physical Exam  Vitals and nursing note reviewed.   Constitutional:       Appearance: He is well-developed.   HENT:      Head: Normocephalic.   Eyes:      Pupils: Pupils are equal, round, and reactive to light.   Cardiovascular:      Rate and Rhythm: Normal rate and regular rhythm.      Heart sounds: Normal heart sounds.   Pulmonary:      Effort: Pulmonary effort is normal.      Breath sounds: Normal breath sounds.   Abdominal:      General: Bowel sounds are normal.      Palpations: Abdomen is soft.   Genitourinary:     Penis: Normal.       Prostate: Enlarged (Prostate is enlarged, tender, and boggy.  No nodules palpated.) and tender.      Rectum: Normal.   Musculoskeletal:         General: Normal range of motion.      Cervical back:  Normal range of motion and neck supple.   Skin:     General: Skin is warm and dry.   Neurological:      Mental Status: He is alert and oriented to person, place, and time.   Psychiatric:         Behavior: Behavior normal.       Urinalysis:  Small blood, red blood cells 0-3.    Bladder scan:  178 cc, repeat scan 101 cc    Assessment/Plan:   1. BPH with obstruction:  Patient has off and on episodes of frequency and urgency.    Bladder scan is slightly elevated at 101 cc.  Prostatitis may be contributing.  Patient will continue Flomax 0.4 mg daily.    Patient had blood drawn today for his primary care provider.  Screening PSA was included.      2. Microscopic hematuria:  Small blood with red blood cells 0-3 noted today.    Patient has had negative workup.      3. Acute prostatitis: Prostate is boggy and tender.    Patient was treated with Bactrim DS last time with no significant improvement.    Will try Omnicef for 30 days.      Will tentatively plan follow-up in 6 months, sooner if needed.  Problem List Items Addressed This Visit    None  Visit Diagnoses       BPH with urinary obstruction    -  Primary    Relevant Orders    POCT Urinalysis(Instrument)    POCT Bladder Scan    Hematuria, microscopic        Relevant Orders    POCT Urinalysis(Instrument)    Acute prostatitis        Relevant Medications    cefdinir (OMNICEF) 300 MG capsule

## 2024-04-19 DIAGNOSIS — E78.2 MIXED HYPERLIPIDEMIA: Primary | ICD-10-CM

## 2024-04-19 RX ORDER — ATORVASTATIN CALCIUM 20 MG/1
20 TABLET, FILM COATED ORAL NIGHTLY
Qty: 90 TABLET | Refills: 3 | Status: SHIPPED | OUTPATIENT
Start: 2024-04-19 | End: 2024-04-23 | Stop reason: SDUPTHER

## 2024-04-23 DIAGNOSIS — E78.2 MIXED HYPERLIPIDEMIA: ICD-10-CM

## 2024-04-23 RX ORDER — ATORVASTATIN CALCIUM 10 MG/1
10 TABLET, FILM COATED ORAL NIGHTLY
Qty: 90 TABLET | Refills: 3 | Status: SHIPPED | OUTPATIENT
Start: 2024-04-23 | End: 2025-04-23

## 2024-04-29 ENCOUNTER — PATIENT MESSAGE (OUTPATIENT)
Dept: UROLOGY | Facility: CLINIC | Age: 59
End: 2024-04-29
Payer: COMMERCIAL

## 2024-05-28 ENCOUNTER — HOSPITAL ENCOUNTER (OUTPATIENT)
Dept: RADIOLOGY | Facility: CLINIC | Age: 59
Discharge: HOME | End: 2024-05-28
Payer: COMMERCIAL

## 2024-05-28 ENCOUNTER — OFFICE VISIT (OUTPATIENT)
Dept: UROLOGY | Facility: CLINIC | Age: 59
End: 2024-05-28
Payer: COMMERCIAL

## 2024-05-28 ENCOUNTER — TELEPHONE (OUTPATIENT)
Dept: UROLOGY | Facility: CLINIC | Age: 59
End: 2024-05-28

## 2024-05-28 VITALS — HEIGHT: 71 IN | BODY MASS INDEX: 19.04 KG/M2 | WEIGHT: 136 LBS

## 2024-05-28 DIAGNOSIS — N13.8 BPH WITH URINARY OBSTRUCTION: Primary | ICD-10-CM

## 2024-05-28 DIAGNOSIS — R10.9 FLANK PAIN: ICD-10-CM

## 2024-05-28 DIAGNOSIS — N40.1 BPH WITH URINARY OBSTRUCTION: Primary | ICD-10-CM

## 2024-05-28 DIAGNOSIS — R31.29 HEMATURIA, MICROSCOPIC: ICD-10-CM

## 2024-05-28 LAB
BILIRUBIN, UA POC OHS: ABNORMAL
BLOOD, UA POC OHS: ABNORMAL
CLARITY, UA POC OHS: CLEAR
COLOR, UA POC OHS: YELLOW
GLUCOSE, UA POC OHS: NEGATIVE
KETONES, UA POC OHS: 80
LEUKOCYTES, UA POC OHS: NEGATIVE
NITRITE, UA POC OHS: NEGATIVE
PH, UA POC OHS: 6
PROTEIN, UA POC OHS: NEGATIVE
PSA, DIAGNOSTIC: 1.13 NG/ML (ref 0.1–4)
SPECIFIC GRAVITY, UA POC OHS: >=1.03
UROBILINOGEN, UA POC OHS: 0.2

## 2024-05-28 PROCEDURE — 3044F HG A1C LEVEL LT 7.0%: CPT | Mod: CPTII,S$GLB,,

## 2024-05-28 PROCEDURE — 36415 COLL VENOUS BLD VENIPUNCTURE: CPT | Mod: S$GLB,,,

## 2024-05-28 PROCEDURE — 99214 OFFICE O/P EST MOD 30 MIN: CPT | Mod: S$GLB,,,

## 2024-05-28 PROCEDURE — 1159F MED LIST DOCD IN RCRD: CPT | Mod: CPTII,S$GLB,,

## 2024-05-28 PROCEDURE — 1160F RVW MEDS BY RX/DR IN RCRD: CPT | Mod: CPTII,S$GLB,,

## 2024-05-28 PROCEDURE — 81003 URINALYSIS AUTO W/O SCOPE: CPT | Mod: QW,S$GLB,,

## 2024-05-28 PROCEDURE — 74018 RADEX ABDOMEN 1 VIEW: CPT | Mod: TC,,, | Performed by: UROLOGY

## 2024-05-28 PROCEDURE — 74018 RADEX ABDOMEN 1 VIEW: CPT | Mod: 26,,, | Performed by: RADIOLOGY

## 2024-05-28 PROCEDURE — 3008F BODY MASS INDEX DOCD: CPT | Mod: CPTII,S$GLB,,

## 2024-05-28 NOTE — TELEPHONE ENCOUNTER
Spoke with pt and informed of results.    ----- Message from Cassie Gutierrez NP sent at 5/28/2024  3:52 PM CDT -----  Please call and inform patient his PSA is 1.13.  Continue as previously discussed with cystoscopy with Dr. Lindsey to evaluate for potential need for TURP

## 2024-05-28 NOTE — PROGRESS NOTES
Subjective:       Patient ID: Jose Acosta is a 58 y.o. male.    Chief Complaint: No chief complaint on file.      HPI: 58-year-old male established patient presents today with complaints difficulty urinating.  Patient was previously seen in April for unresolved prostatitis after 30 days of Bactrim.  Patient was then started on 30 days of Omnicef.  Today patient reports he has completed entire course of antibiotics and is still having some difficulty with urination.  Patient reports he is intermittently having to strain to urinate and has a weak stream.  He reports nocturia 2-4 times per night despite compliance with tamsulosin.  Patient reports they are times where he feels he is not fully emptying his bladder.  Patient denies dysuria, hematuria, urgency, fever or chills.      Patient reports in the last 2-3 days he has developed some intermittent flank pain bilaterally.  Patient denies a history of kidney stones but does have 10-15 RBCs today on his urinalysis.  Patient also has a history of microscopic hematuria with a negative workup in 2022.  Patient does report that he has chronic constipation that he is currently taking Linzess for.       Past Medical History:   Past Medical History:   Diagnosis Date    Allergy     BPH (benign prostatic hyperplasia)     Chronic constipation     Hypochloremia        Past Surgical Historical: History reviewed. No pertinent surgical history.     Medications:   Medication List with Changes/Refills   Current Medications    ATORVASTATIN (LIPITOR) 10 MG TABLET    Take 1 tablet (10 mg total) by mouth every evening.    BETAMETHASONE VALERATE 0.1% (VALISONE) 0.1 % CREA    Apply topically 2 (two) times daily.    CELECOXIB (CELEBREX) 100 MG CAPSULE    Take 1 capsule (100 mg total) by mouth 2 (two) times daily.    CYCLOBENZAPRINE (FLEXERIL) 10 MG TABLET    Take 1 tablet (10 mg total) by mouth 2 (two) times daily as needed for Muscle spasms.    LEVOCETIRIZINE (XYZAL) 5 MG TABLET     levocetirizine 5 mg tablet   TAKE 1 TABLET BY MOUTH EVERY DAY IN THE EVENING    LINACLOTIDE (LINZESS) 290 MCG CAP CAPSULE    Take 1 capsule (290 mcg total) by mouth once daily.    POTASSIUM CHLORIDE SA (K-DUR,KLOR-CON) 20 MEQ TABLET    TAKE 1 TABLET BY MOUTH ONCE DAILY    TAMSULOSIN (FLOMAX) 0.4 MG CAP    TAKE 1 CAPSULE BY MOUTH EVERY DAY    VARICELLA-ZOSTER GE-AS01B, PF, (SHINGRIX, PF,) 50 MCG/0.5 ML INJECTION    Inject 0.5 mL IM now; repeat x 1 dose in 8 weeks        Past Social History:   Social History     Socioeconomic History    Marital status:    Occupational History    Occupation:    Tobacco Use    Smoking status: Never     Passive exposure: Never    Smokeless tobacco: Never   Substance and Sexual Activity    Alcohol use: Not Currently     Comment: rarely    Drug use: Not Currently     Social Determinants of Health     Financial Resource Strain: Low Risk  (3/6/2024)    Overall Financial Resource Strain (CARDIA)     Difficulty of Paying Living Expenses: Not very hard   Food Insecurity: No Food Insecurity (3/6/2024)    Hunger Vital Sign     Worried About Running Out of Food in the Last Year: Never true     Ran Out of Food in the Last Year: Never true   Transportation Needs: No Transportation Needs (3/6/2024)    PRAPARE - Transportation     Lack of Transportation (Medical): No     Lack of Transportation (Non-Medical): No   Physical Activity: Sufficiently Active (3/6/2024)    Exercise Vital Sign     Days of Exercise per Week: 4 days     Minutes of Exercise per Session: 40 min   Stress: No Stress Concern Present (3/6/2024)    [employer] - Occupational Stress Questionnaire     Feeling of Stress : Not at all   Housing Stability: Low Risk  (3/6/2024)    Housing Stability Vital Sign     Unable to Pay for Housing in the Last Year: No     Number of Places Lived in the Last Year: 1     Unstable Housing in the Last Year: No       Allergies:   Review of patient's  allergies indicates:   Allergen Reactions    Codeine         Family History:   Family History   Problem Relation Name Age of Onset    No Known Problems Mother      Cancer Father          Review of Systems:  Review of Systems   Constitutional: Negative.    HENT: Negative.     Eyes: Negative.    Respiratory: Negative.     Cardiovascular: Negative.    Gastrointestinal:  Positive for constipation.   Endocrine: Negative.    Genitourinary:  Positive for difficulty urinating and frequency.        Nocturia 2-4 times per night, weak stream   Musculoskeletal:  Positive for back pain.   Skin: Negative.    Allergic/Immunologic: Negative.    Neurological: Negative.    Hematological: Negative.    Psychiatric/Behavioral: Negative.       Physical Exam:  Physical Exam  Constitutional:       Appearance: Normal appearance.   Cardiovascular:      Rate and Rhythm: Normal rate.   Pulmonary:      Effort: Pulmonary effort is normal.   Abdominal:      General: Bowel sounds are normal.      Palpations: Abdomen is soft.   Genitourinary:     Comments: Deferred DELTA today  Neurological:      Mental Status: He is alert and oriented to person, place, and time.     Urinalysis:  WBCs 0-2, RBCs 10-15 small, negative epithelial, negative bacteria, nitrite negative     PVR 40 mL    KUB:  Negative for nephrolithiasis.  Constipation noted.  Pending official radiology read  Assessment/Plan:     BPH with obstruction:  We will draw PSA level today post 2 rounds of antibiotics for suspected prostatitis.  We will also set patient up for a cystoscopy with Dr. Lindsey to evaluate if he is a candidate for a TURP.     Flank pain/hematuria:  No kidney stones noted on KUB.  Patient does have some constipation which is likely the cause of his intermittent lower back pain.  Patient reports he has medications for constipation on a hand at home that he will take this evening.  He denies gross hematuria however has some microscopic hematuria on his urinalysis.  He has  had a workup for this in the past which was negative    Follow up to be arranged post cystoscopy  Problem List Items Addressed This Visit    None  Visit Diagnoses       BPH with urinary obstruction    -  Primary    Relevant Orders    X-Ray Abdomen AP 1 View (Completed)    Cystoscopy    Prostate Specific Antigen, Diagnostic    Flank pain        Relevant Orders    POCT Urinalysis(Instrument) (Completed)    X-Ray Abdomen AP 1 View (Completed)

## 2024-05-30 ENCOUNTER — PROCEDURE VISIT (OUTPATIENT)
Dept: UROLOGY | Facility: CLINIC | Age: 59
End: 2024-05-30
Payer: COMMERCIAL

## 2024-05-30 VITALS — BODY MASS INDEX: 18.81 KG/M2 | HEIGHT: 71 IN | WEIGHT: 134.38 LBS

## 2024-05-30 DIAGNOSIS — N13.8 BPH WITH URINARY OBSTRUCTION: ICD-10-CM

## 2024-05-30 DIAGNOSIS — N40.1 BPH WITH URINARY OBSTRUCTION: ICD-10-CM

## 2024-05-30 PROCEDURE — 52000 CYSTOURETHROSCOPY: CPT | Mod: S$GLB,,, | Performed by: UROLOGY

## 2024-05-30 NOTE — PROCEDURES
Cystoscopy    Date/Time: 5/30/2024 10:00 AM    Performed by: Benedicto Lindsey MD  Authorized by: Cassie Gutierrez NP    Timeout: prior to procedure the correct patient, procedure, and site was verified    Prep: patient was prepped and draped in usual sterile fashion    Anesthesia:  Intraurethral instillation  Indications: hematuria    Position:  Supine  Anesthesia:  Intraurethral instillation  Patient sedated?: No    Preparation: Patient was prepped and draped in usual sterile fashion    Scope type:  Flexible cystoscope  External exam normal: Yes    Urethra normal: Yes    Prostate normal: Yes    Comments:      Patient was brought to the procedure room placed on the table padded prepped and draped in usual sterile fashion in supine position. The cystoscope was inserted into the urethra and advanced the urethra was normal prostate showed greater than expected enlargement for patient's age, the bladder is entered and inspected is found to be free of tumor stone or foreign body.  Bilateral ureteral orifices were identified and noted to be normal in appearance with clear efflux of urine at this point the scope was removed the patient tolerated the procedure well there were no complications    Impression:   Patient has enlarged prostate with a median lobe we will proceed with Transurethral resection of the prostate patient will call when he is ready to do this

## 2024-06-03 ENCOUNTER — PATIENT MESSAGE (OUTPATIENT)
Dept: UROLOGY | Facility: CLINIC | Age: 59
End: 2024-06-03
Payer: COMMERCIAL

## 2024-06-04 ENCOUNTER — TELEPHONE (OUTPATIENT)
Dept: UROLOGY | Facility: CLINIC | Age: 59
End: 2024-06-04
Payer: COMMERCIAL

## 2024-06-04 NOTE — TELEPHONE ENCOUNTER
Contacted pt, scheduled for TURP Consents. Pt did not want to pick a date at this time. GABRIELP

## 2024-06-06 DIAGNOSIS — R39.198 DIFFICULTY VOIDING: ICD-10-CM

## 2024-06-07 RX ORDER — TAMSULOSIN HYDROCHLORIDE 0.4 MG/1
1 CAPSULE ORAL
Qty: 90 CAPSULE | Refills: 0 | Status: SHIPPED | OUTPATIENT
Start: 2024-06-07

## 2024-06-11 ENCOUNTER — OFFICE VISIT (OUTPATIENT)
Dept: GASTROENTEROLOGY | Facility: CLINIC | Age: 59
End: 2024-06-11
Payer: COMMERCIAL

## 2024-06-11 VITALS
DIASTOLIC BLOOD PRESSURE: 68 MMHG | SYSTOLIC BLOOD PRESSURE: 108 MMHG | RESPIRATION RATE: 16 BRPM | BODY MASS INDEX: 19.23 KG/M2 | HEART RATE: 87 BPM | WEIGHT: 137.38 LBS | HEIGHT: 71 IN | OXYGEN SATURATION: 94 %

## 2024-06-11 DIAGNOSIS — K58.1 IRRITABLE BOWEL SYNDROME WITH CONSTIPATION: Primary | ICD-10-CM

## 2024-06-11 DIAGNOSIS — Z86.010 HISTORY OF COLON POLYPS: ICD-10-CM

## 2024-06-11 PROCEDURE — 1160F RVW MEDS BY RX/DR IN RCRD: CPT | Mod: CPTII,S$GLB,, | Performed by: INTERNAL MEDICINE

## 2024-06-11 PROCEDURE — 99213 OFFICE O/P EST LOW 20 MIN: CPT | Mod: S$GLB,,, | Performed by: INTERNAL MEDICINE

## 2024-06-11 PROCEDURE — 3008F BODY MASS INDEX DOCD: CPT | Mod: CPTII,S$GLB,, | Performed by: INTERNAL MEDICINE

## 2024-06-11 PROCEDURE — 3078F DIAST BP <80 MM HG: CPT | Mod: CPTII,S$GLB,, | Performed by: INTERNAL MEDICINE

## 2024-06-11 PROCEDURE — 3074F SYST BP LT 130 MM HG: CPT | Mod: CPTII,S$GLB,, | Performed by: INTERNAL MEDICINE

## 2024-06-11 PROCEDURE — 3044F HG A1C LEVEL LT 7.0%: CPT | Mod: CPTII,S$GLB,, | Performed by: INTERNAL MEDICINE

## 2024-06-11 PROCEDURE — 1159F MED LIST DOCD IN RCRD: CPT | Mod: CPTII,S$GLB,, | Performed by: INTERNAL MEDICINE

## 2024-06-11 RX ORDER — FLUTICASONE PROPIONATE 50 MCG
2 SPRAY, SUSPENSION (ML) NASAL DAILY
COMMUNITY
Start: 2024-05-29

## 2024-06-11 NOTE — PROGRESS NOTES
Clinic Note    Reason for visit:  The primary encounter diagnosis was Irritable bowel syndrome with constipation. A diagnosis of History of colon polyps was also pertinent to this visit.    PCP: Bel Bowser       HPI:  This is a 58 y.o. male here for follow up. Patient with constipation. On linzess 290mcg daily and having good BM. No blood in stool. He has had increase in gas since starting linzess. Describes as having severe gas. He takes GASx which does not help. No bloating. He does have BPH and attributes constipation gas issues to enlarged prostate. He is planning for TURP 8/2024.    Colonoscopy 8/21/2023: 1 TVA, 1 hyp, repeat colonoscopy in 3 years.     8/28/2023 Sitz day 6: Large amount of stool throughout colon. 1 retained in rectum.   8/22/2023 Sitz marker Day 0: Moderate volume of retained stool in colon     12/02/2022 CT A/P w/ w/o:subcentimeter hypoattenuating lesions in both kidneys.No bowel wall thickening.  5/19/2022 CT A/P w/ and w/o: 2 subcentimeter hypodensities in R kidney. Mild long segment wall thickening of distal descending colon and rectosigmoid.     1/31/2022 Cologuard- Negative    Review of Systems   Constitutional:  Negative for diaphoresis, fatigue, fever and unexpected weight change.   HENT:  Negative for hearing loss, mouth sores, postnasal drip, sore throat and trouble swallowing.    Eyes:  Negative for pain, discharge and eye dryness.   Respiratory:  Negative for apnea, cough, choking, chest tightness, shortness of breath and wheezing.    Cardiovascular:  Negative for chest pain, palpitations and leg swelling.   Gastrointestinal:  Negative for abdominal distention, abdominal pain, anal bleeding, blood in stool, change in bowel habit, constipation, diarrhea, nausea, rectal pain, vomiting, reflux and fecal incontinence.   Genitourinary:  Negative for bladder incontinence, dysuria and hematuria.   Musculoskeletal:  Negative for arthralgias, back pain and joint swelling.    Integumentary:  Negative for color change and rash.   Allergic/Immunologic: Negative for environmental allergies and food allergies.   Neurological:  Negative for seizures and headaches.   Hematological:  Negative for adenopathy. Does not bruise/bleed easily.        Past Medical History:   Diagnosis Date    Allergy     BPH (benign prostatic hyperplasia)     Chronic constipation     Hypochloremia      History reviewed. No pertinent surgical history.  Family History   Problem Relation Name Age of Onset    No Known Problems Mother      Cancer Father       Social History     Tobacco Use    Smoking status: Never     Passive exposure: Never    Smokeless tobacco: Never   Substance Use Topics    Alcohol use: Not Currently     Comment: rarely    Drug use: Not Currently     Review of patient's allergies indicates:   Allergen Reactions    Codeine         Medication List with Changes/Refills   Current Medications    ATORVASTATIN (LIPITOR) 10 MG TABLET    Take 1 tablet (10 mg total) by mouth every evening.    BETAMETHASONE VALERATE 0.1% (VALISONE) 0.1 % CREA    Apply topically 2 (two) times daily.    CELECOXIB (CELEBREX) 100 MG CAPSULE    Take 1 capsule (100 mg total) by mouth 2 (two) times daily.    CYCLOBENZAPRINE (FLEXERIL) 10 MG TABLET    Take 1 tablet (10 mg total) by mouth 2 (two) times daily as needed for Muscle spasms.    FLUTICASONE PROPIONATE (FLONASE) 50 MCG/ACTUATION NASAL SPRAY    2 sprays by Each Nostril route once daily.    LEVOCETIRIZINE (XYZAL) 5 MG TABLET    levocetirizine 5 mg tablet   TAKE 1 TABLET BY MOUTH EVERY DAY IN THE EVENING    LINACLOTIDE (LINZESS) 290 MCG CAP CAPSULE    Take 1 capsule (290 mcg total) by mouth once daily.    POTASSIUM CHLORIDE SA (K-DUR,KLOR-CON) 20 MEQ TABLET    TAKE 1 TABLET BY MOUTH ONCE DAILY    TAMSULOSIN (FLOMAX) 0.4 MG CAP    TAKE 1 CAPSULE BY MOUTH EVERY DAY    VARICELLA-ZOSTER GE-AS01B, PF, (SHINGRIX, PF,) 50 MCG/0.5 ML INJECTION    Inject 0.5 mL IM now; repeat x 1 dose in  "8 weeks         Vital Signs:  /68 (BP Location: Left arm, Patient Position: Sitting, BP Method: Medium (Automatic))   Pulse 87   Resp 16   Ht 5' 11" (1.803 m)   Wt 62.3 kg (137 lb 6.4 oz)   SpO2 (!) 94%   BMI 19.16 kg/m²         Physical Exam  Constitutional:       General: He is not in acute distress.     Appearance: Normal appearance. He is well-developed. He is not ill-appearing or toxic-appearing.   HENT:      Head: Normocephalic and atraumatic.      Nose: Nose normal.      Mouth/Throat:      Mouth: Mucous membranes are moist.      Pharynx: Oropharynx is clear. No oropharyngeal exudate or posterior oropharyngeal erythema.   Eyes:      General: Lids are normal. No scleral icterus.        Right eye: No discharge.         Left eye: No discharge.      Conjunctiva/sclera: Conjunctivae normal.   Cardiovascular:      Rate and Rhythm: Normal rate and regular rhythm.      Pulses:           Radial pulses are 2+ on the right side and 2+ on the left side.   Pulmonary:      Effort: Pulmonary effort is normal. No respiratory distress.      Breath sounds: No stridor. No wheezing.   Abdominal:      General: Bowel sounds are normal. There is no distension.      Palpations: Abdomen is soft. There is no fluid wave, hepatomegaly, splenomegaly or mass.      Tenderness: There is no abdominal tenderness. There is no guarding or rebound.   Musculoskeletal:      Cervical back: Full passive range of motion without pain.   Lymphadenopathy:      Cervical: No cervical adenopathy.   Skin:     General: Skin is warm and dry.      Capillary Refill: Capillary refill takes less than 2 seconds.      Coloration: Skin is not cyanotic, jaundiced or pale.   Neurological:      General: No focal deficit present.      Mental Status: He is alert and oriented to person, place, and time.   Psychiatric:         Mood and Affect: Mood normal.         Behavior: Behavior is cooperative.            All of the data above and below has been reviewed by " myself and any further interpretations will be reflected in the assessment and plan.   The data includes review of external notes, and independent interpretation of lab results, procedures, x-rays, and imaging reports.      Assessment:  Irritable bowel syndrome with constipation    History of colon polyps         IBS-C- on linzess 290mcg daily. Linzess is cost prohibitive- discussed linzess savings card. Will give samples of trulance to try due to excessive gas with Linzess.  Hx colon polyps- colonoscopy due 8/2026    Recommendations:    Continue with Linzess 290 mcg daily.  May try trulance samples and notify us if you would like prescription to change the Linzess to Trulance.      Risks, benefits, and alternatives of medical management, any associated procedures, and/or treatment discussed with the patient. Patient given opportunity to ask questions and voices understanding. Patient has elected to proceed with the recommended care modalities as discussed.    Instructed patient to notify my office if they have not been contacted within two weeks after any procedures, submitting any samples (biopsies, blood, stool, urine, etc.) or after any imaging (X-ray, CT, MRI, etc.).     Follow up in about 1 year (around 6/11/2025).    Order summary:  No orders of the defined types were placed in this encounter.     This assessment, plan, and documentation was performed in collaboration with Cassie Rod NP.     This document may have been created using a voice recognition transcribing system. Incorrect words or phrases may have been missed during proofreading. Please interpret accordingly or contact me for clarification.     Augustina Rasmussen MD

## 2024-06-11 NOTE — LETTER
June 11, 2024        Bel Bowser, NP  [address]  [address]  [place] [address]             [facility]  [address] [address]  Phone: [phone]  Fax: [phone]   Patient: Jose Acosta   MR Number: [id number]   YOB: 1965   Date of Visit: 6/11/2024       Dear Dr. Bowser:    Thank you for referring Jose Acosta to me for evaluation. Below are the relevant portions of my assessment and plan of care.            If you have questions, please do not hesitate to call me. I look forward to following Jose along with you.    Sincerely,      Augustina Rasmussen MD           CC  No Recipients

## 2024-06-11 NOTE — PATIENT INSTRUCTIONS
Continue with Linzess 290 mcg daily.  May try trulance samples and notify us if you would like prescription to change the Linzess to Trulance.    Please notify my office if you have not been contacted within two weeks after any procedures, submitting any samples (biopsies, blood, stool, urine, etc.) or after any imaging (X-ray, CT, MRI, etc.).

## 2024-08-09 DIAGNOSIS — N40.1 BPH WITH URINARY OBSTRUCTION: Primary | ICD-10-CM

## 2024-08-09 DIAGNOSIS — N13.8 BPH WITH URINARY OBSTRUCTION: Primary | ICD-10-CM

## 2024-08-12 ENCOUNTER — CLINICAL SUPPORT (OUTPATIENT)
Dept: UROLOGY | Facility: CLINIC | Age: 59
End: 2024-08-12
Payer: COMMERCIAL

## 2024-08-12 DIAGNOSIS — N40.1 BPH WITH URINARY OBSTRUCTION: Primary | ICD-10-CM

## 2024-08-12 DIAGNOSIS — N13.8 BPH WITH URINARY OBSTRUCTION: Primary | ICD-10-CM

## 2024-08-12 NOTE — PROGRESS NOTES
Patient here for TURP education & to sign consents. Educated patient on procedure including risks & alternatives. Pre-op instructions reviewed & copy given. Medications, allergies, medical history & pharmacy updated. Copy of updated med list & pre-op lab orders given to patient & faxed to surgery. Questions answered, verbalized understanding & consents signed. Instructed to call with any questions or concerns.

## 2024-08-15 NOTE — PATIENT INSTRUCTIONS
"GUIDELINES FOR LOW CHOLESTEROL, LOW-TRIGLYCERIDE DIETS  FOODS TO AVOID    MEATS & FISH Marbled beef, pork, ortega, sausage, and other pork products; fatty fowl (duck, goose); skin  and fat of turkey and chicken; processed meats; luncheon meats (salami, bologna); hot dogs  and fast-food hamburgers (they're loaded with fat); organ meats (kidneys, liver); canned fish  packed in oil.    EGGS Limit egg yolks to two per week.    FRUITS Coconuts (rich in saturated fats).  VEGETABLES Starchy vegetables (potatoes, corn, lima beans, dried peas. beans) may be used only if  substitutes for a serving of bread or cereal. (Baked potato skin, however, is desirable for its  fiber content.)    BEAN'S Commercial baked beans with sugar and or pork added.  NUTS Limit peanuts. Walnuts and almonds are more preferable type nuts.  BREADS & GRAINS Any baked goods with shortening and/or sugar. Commercial mixes with dried eggs and  whole milk. Avoid sweet rolls, doughnuts, breakfast pastries (Swedish). and sweetened  packaged cereals (the added sugar converts readily to triglycerides).    MILK PRODUCTS Whole milk and whole milk packaged goods; cream; ice cream: whole-milk puddings,  yogurt, or cheeses; nondairy cream substitutes.    FATS & OILS Butter, lard, animal fats, ortega drippings, gravies, cream sauces as well as palm and coconut  oils. All these are high in saturated fats. Examine labels on "cholesterol free-products for  hydrogenated fats" (These are oils that have been hardened into solids and in the process  have become saturated.)    DESSERTS Fried snack foods like potato chips; chocolate; candies in general; jams; jellies; &  & SNACKS syrups; whole-milk puddings; ice cream and milk sherbets; hydrogenated peanut butter.  BEVERAGES Sugared fruit juices and soft drinks; cocoa made with whole milk and or sugar. When using  alcohol (1/2 oz liquor, 12 oz beer, 5 oz dry table wine per serving "), one serving may be  substituted for one " bread or cereal serving (limit: two servings of alcohol per day).    SPECIAL NOTES  1. Remember that even non-limited foods should be used in moderation.  2. While on a cholesterol-lowering diet, be sure to avoid animal fats and marbled meats.  3. While on a triglyceride lowering diet, be sure to avoid sweets and to control the amount  of carbohydrates you eat (starchy foods such as flour, bread, and potatoes). Buy a good  low-fat cookbook, such as the one published by the American Heart Association.  4. Consult your physician if you have any questions.  SEE REVERSE SIDE FOR FOODS TO USE  My docs/Clinic/Guidelines for Low........(Rosa Lew) Revised 10-26-11  GUIDELINES FOR LOW CHOLESTEROL, LOW-TRIGLYCERIDE DIETS  FOODS TO USE  MEAT & FISH Choose lean meats (chicken, turkey, veal, and nonfatty cuts of beef with excess fat trimmed;  one serving = 3 oz of cooked meat). Also, fresh or frozen fish, canned fish packed in water,  and shellfish (lobster, crabs, shrimp, oysters). Limit use to no more than one sensing of one  of these per week. Shellfish are high in cholesterol but low in saturated fat and should be  used sparingly. Meats and fish should be broiled (pan or oven) or baked on a rack.  EGGS Egg substitutes and egg whites (use freely). Egg yolks (limit two per week).  FRUITS Eat three servings of fresh fruit per day (1 serving = ½ cup). Be sure to have at least one  citrus fruit daily. Frozen or canned fruit with no sugar or syrup added may be used.  VEGETABLES Most vegetables are not limited (see reverse side). One dark green (string beans, escarole) or  one deep yellow (squash) vegetable is recommended daily. Cauliflower, broccoli, and  celery, as well as potato skins are recommended for their fiber content. (Fiber is associated  with cholesterol reduction.) It is preferable to steam vegetables, but they may be boiled,  strained, or braised with polyunsaturated vegetable oil (see below).  BEANS Dried peas or  beans (1 serving = ½ cup) may be used as a bread substitute.  NUTS Almonds, walnuts, and peanuts may be used sparingly (1 serving = 1 tablespoonful). Use  pumpkin, sesame, or sunflower seeds.  BREAD & GRAINS One roll or one slice of whole grain or enriched bread may be used, or three soda crackers  or four pieces of jessee toast as a substitute. Spaghetti, rice or noodles (½ cup) or ½ large  ear of corn may be used as a bread substitute. In preparing these foods, do not use butter or  shortening, use soft margarine. Also use egg and sugar substitutes. Choose high fiber grains,  such as oats and whole wheat.  CEREALS Use ½ cup of hot cereal or ¾ cup of cold cereal per day. Add a sugar substitute if desired,  with 99% fat-free or skim milk.  MILK PRODUCTS Always use 99% fat free or skim milk, dairy products such as low fat cheeses (farmer's,  uncreamed diet cottage), low fat yogurt, and powdered skim milk.  FATS & OILS Use soft (not stick) margarine; vegetable oils that are high in polyunsaturated fats (such as  safflower, sunflower, soybean, corn, and cottonseed). Always refrigerate meat drippings to  harden the fat and remove it before preparing gravies.  DESSERT Limit to two servings per day; substitute each serving for a bread/cereal serving: ice milk,  & SNACKS water sherbet (¼ cup); unflavored gelatin or gelatin flavored with sugar substitute (1/3  cup); pudding prepared with skim milk (½ cup); egg white souffles: unbuttered popcorn (1  1/2 cups). Substitute carob for chocolate.  BEVERAGES Fresh fruit juices (limit 4 oz per day); black coffee, plain or herbal teas; soft drinks with  sugar substitutes; club soda, preferably salt free; cocoa made with skim milk; or nonfat dried  milk and water (sugar substitute added if desired); clear broth. Alcohol: limit two servings  per day (see reverse side).  MISCELLANEOUS You may use the following freely: vinegar, spices, herbs, nonfat bouillon, mustard.  [facility]  sauce, soy sauce, flavoring essence.     absent

## 2024-08-23 ENCOUNTER — OFFICE VISIT (OUTPATIENT)
Dept: URGENT CARE | Facility: CLINIC | Age: 59
End: 2024-08-23
Payer: COMMERCIAL

## 2024-08-23 VITALS
HEART RATE: 77 BPM | OXYGEN SATURATION: 95 % | WEIGHT: 140 LBS | RESPIRATION RATE: 18 BRPM | HEIGHT: 71 IN | BODY MASS INDEX: 19.6 KG/M2 | SYSTOLIC BLOOD PRESSURE: 124 MMHG | DIASTOLIC BLOOD PRESSURE: 87 MMHG | TEMPERATURE: 99 F

## 2024-08-23 DIAGNOSIS — R50.9 LOW GRADE FEVER: ICD-10-CM

## 2024-08-23 DIAGNOSIS — J02.9 SORE THROAT: ICD-10-CM

## 2024-08-23 DIAGNOSIS — J06.9 UPPER RESPIRATORY TRACT INFECTION, UNSPECIFIED TYPE: Primary | ICD-10-CM

## 2024-08-23 LAB
ANION GAP SERPL CALC-SCNC: 8 MMOL/L (ref 3–11)
APPEARANCE, UA: CLEAR
BACTERIA SPEC CULT: ABNORMAL /HPF
BASOPHILS NFR BLD: 0.7 % (ref 0–3)
BILIRUB UR QL STRIP: NEGATIVE MG/DL
BUN SERPL-MCNC: 15 MG/DL (ref 7–18)
BUN/CREAT SERPL: 15.15 RATIO (ref 7–18)
CALCIUM SERPL-MCNC: 8.7 MG/DL (ref 8.8–10.5)
CHLORIDE SERPL-SCNC: 101 MMOL/L (ref 100–108)
CO2 SERPL-SCNC: 28 MMOL/L (ref 21–32)
COLOR UR: ABNORMAL
CREAT SERPL-MCNC: 0.99 MG/DL (ref 0.7–1.3)
CTP QC/QA: YES
EOSINOPHIL NFR BLD: 4.3 % (ref 1–3)
ERYTHROCYTE [DISTWIDTH] IN BLOOD BY AUTOMATED COUNT: 12.7 % (ref 12.5–18)
GFR ESTIMATION: > 60
GLUCOSE (UA): NORMAL MG/DL
GLUCOSE SERPL-MCNC: 87 MG/DL (ref 70–110)
HCT VFR BLD AUTO: 47.8 % (ref 42–52)
HGB BLD-MCNC: 15.9 G/DL (ref 14–18)
HGB UR QL STRIP: 25 /UL
KETONES UR QL STRIP: NEGATIVE MG/DL
LEUKOCYTE ESTERASE UR QL STRIP: NEGATIVE /UL
LYMPHOCYTES NFR BLD: 18.2 % (ref 25–40)
MCH RBC QN AUTO: 29.9 PG (ref 27–31.2)
MCHC RBC AUTO-ENTMCNC: 33.3 G/DL (ref 31.8–35.4)
MCV RBC AUTO: 90 FL (ref 80–97)
MONOCYTES NFR BLD: 8.4 % (ref 1–15)
NEUTROPHILS # BLD AUTO: 5.9 10*3/UL (ref 1.8–7.7)
NEUTROPHILS NFR BLD: 68.1 % (ref 37–80)
NITRITE UR QL STRIP: NEGATIVE
NUCLEATED RED BLOOD CELLS: 0 %
PH UR STRIP: 7 PH (ref 5–9)
PLATELETS: 264 10*3/UL (ref 142–424)
POTASSIUM SERPL-SCNC: 4.2 MMOL/L (ref 3.6–5.2)
PROT UR QL STRIP: NEGATIVE MG/DL
RBC # BLD AUTO: 5.31 10*6/UL (ref 4.7–6.1)
RBC #/AREA URNS HPF: ABNORMAL /HPF (ref 0–2)
SARS-COV-2 AG RESP QL IA.RAPID: NEGATIVE
SERVICE COMMENT 03: ABNORMAL
SODIUM BLD-SCNC: 137 MMOL/L (ref 135–145)
SP GR UR STRIP: 1 (ref 1–1.03)
SPECIMEN COLLECTION METHOD, URINE: ABNORMAL
SQUAMOUS EPITHELIAL, UA: ABNORMAL /LPF
UROBILINOGEN UR STRIP-ACNC: NORMAL MG/DL
WBC # BLD: 8.7 10*3/UL (ref 4.6–10.2)
WBC #/AREA URNS HPF: ABNORMAL /HPF (ref 0–5)

## 2024-08-23 RX ORDER — BROMPHENIRAMINE MALEATE, PSEUDOEPHEDRINE HYDROCHLORIDE, AND DEXTROMETHORPHAN HYDROBROMIDE 2; 30; 10 MG/5ML; MG/5ML; MG/5ML
5-10 SYRUP ORAL
Qty: 120 ML | Refills: 0 | Status: SHIPPED | OUTPATIENT
Start: 2024-08-23

## 2024-08-23 NOTE — PROGRESS NOTES
"Subjective:      Patient ID: Jose Acosta is a 59 y.o. male.    Vitals:  height is 5' 11" (1.803 m) and weight is 63.5 kg (140 lb). His oral temperature is 98.5 °F (36.9 °C). His blood pressure is 124/87 and his pulse is 77. His respiration is 18 and oxygen saturation is 95%.     Chief Complaint: Sore Throat, Cough, and Nasal Congestion    Pt is complaining of sore throat, cough, congestion. Pt symptoms started today. Pt has not taking any OTC medications.  103 temp on wednesday and temp today was 100. No sick contact. He states that his partner is sick.     Sore Throat   This is a new problem. The current episode started in the past 7 days. The maximum temperature recorded prior to his arrival was 100.4 - 100.9 F (low grade). Associated symptoms include congestion and coughing. Pertinent negatives include no abdominal pain, headaches, shortness of breath or vomiting.   Cough  Associated symptoms include chest pain (2/2 to cough), myalgias and a sore throat. Pertinent negatives include no chills, fever, headaches or shortness of breath.       Constitution: Negative for chills and fever.   HENT:  Positive for congestion and sore throat.    Cardiovascular:  Positive for chest pain (2/2 to cough).   Respiratory:  Positive for cough. Negative for shortness of breath.    Gastrointestinal:  Negative for abdominal pain, nausea and vomiting.   Musculoskeletal:  Positive for muscle ache.   Neurological:  Negative for headaches.      Objective:     Physical Exam   HENT:   Head: Normocephalic and atraumatic.   Ears:   Right Ear: Tympanic membrane normal.   Left Ear: Tympanic membrane normal.   Nose: Nose normal.   Mouth/Throat: Mucous membranes are moist. Oropharynx is clear.   Eyes: Conjunctivae are normal. Pupils are equal, round, and reactive to light.   Cardiovascular: Normal rate, regular rhythm and normal heart sounds.   Pulmonary/Chest: Effort normal and breath sounds normal.   Abdominal: Normal appearance. "   Neurological: He is alert.   Psychiatric: His behavior is normal. Mood normal.     Results for orders placed or performed in visit on 08/23/24   SARS Coronavirus 2 Antigen, POCT Manual Read   Result Value Ref Range    SARS Coronavirus 2 Antigen Negative Negative     Acceptable Yes       Assessment:     1. Upper respiratory tract infection, unspecified type    2. Sore throat    3. Low grade fever        Plan:       Upper respiratory tract infection, unspecified type    Sore throat  -     SARS Coronavirus 2 Antigen, POCT Manual Read  -     Cancel: POCT Strep A, Molecular    Low grade fever    Other orders  -     brompheniramine-pseudoeph-DM (BROMFED DM) 2-30-10 mg/5 mL Syrp; Take 5-10 mLs by mouth every 6 to 8 hours as needed (cough).  Dispense: 120 mL; Refill: 0        Sore throat Mixture  5 ML Children's Tylenol  5 mL Liquid Benedryl  10 mL Maalox    Mix together in a cup; gargle then swallow  May be used every 4 to 6 hours       General Instructions for Upper Respiratory Infection (URI):     Alternate Tylenol and Ibuprofen every 3 hrs for fever, pain and inflammation.   Avoid NSAIDs (Ibuprofen, Aleve, Motrin, Aspirin) if you are pregnant, or have advanced kidney disease or history of stomach ulcers/bleeding.     Sore throat/Post Nasal Drip:  Salt water gargles, chloraseptic spray, lozenges, or cough drops   Honey/lemon water or warm tea   Cepachol   Zantac will help if there is reflux from the post nasal drip and helpful to take at night     Sinus Congestion/Runny nose:  Zyrtec/Claritin/Allegra during the day and Benadryl at night as needed  Mucinex, Dayquil, or Coricidin   If you DO NOT have Hypertension (high blood pressure) or any history of palpitations, it is ok to take over the counter Sudafed or Mucinex D or Allegra-D or Claritin-D or Zyrtec-D.  If you do take one of the above, it is ok to combine that with plain over the counter Mucinex or Allegra or Claritin or Zyrtec. If, for example, you  are taking Zyrtec -D, you can combine that with Mucinex, but not Mucinex-D. If you are taking Mucinex-D, you can combine that with plain Allegra or Claritin or Zyrtec.  If you DO have Hypertension or palpitations, it is safe to take Coricidin HBP for relief of sinus symptoms.  Nasal saline spray reduces inflammation and dryness  Flonase OTC or Nasacort OTC to help decrease inflammation in nasal turbinates and allow sinuses to drain  Warm face compresses/hot showers as often as you can to open sinuses and allow to drain.   Vicks vapor rub and/or humidifier at night  Cold-eeze helps to reduce the duration of URI symptoms if taken early  Elderberry, Emergen-C, and/or Zinc to reduce duration of viral URI symptoms    Cough:  Robitussin or Delsym as needed  Cough drops  Vicks vapor rub and/or humidifier at night       Rest as much as you can     Your symptoms are likely viral and will typically last 7-10 days, maybe longer depending on how it affects your body.  You are contagious until day 5-7, so minimize contact with others to reduce the spread to others and stay home from work or school as we discussed. Dehydration is preventable but is one of the main reasons why you will feel so badly. Drink pedialyte, gatorade or propel. Stay hydrated.  Antibiotics are not needed unless a complication( such as Otitis Media, Bacterial sinus infection or pneumonia) develops. Taking antibiotics for Flu/Cold is not supported by evidence-based medicine and can expose you to unnecessary side effects of the medication, such as anaphylaxis, yeast infection and leads to antibiotic resistance.     Please follow up with your primary care provider within 5-7 days if your signs and symptoms have not resolved or worsen.  If your condition worsens or fails to improve we recommend that you receive another evaluation at the emergency  room immediately or contact your primary medical clinic to discuss your concerns.  You must understand that you  have received an Urgent Care treatment only and that you may be released before all of  your medical problems are known or treated. You, the patient, will arrange for follow up care as instructed.    Go to Emergency Room immediately if you experience any:  Chest pain, shortness of breath, wheezing or difficulty breathing,  Severe headache, face, neck or ear pain,  New rash,  Fever over 101.5º F (38.6 C) for more than three days,  Confusion, behavior change or seizure,  Severe weakness or dizziness or passing out      Medical Decision Making:   Differential Diagnosis:   URI  Clinical Tests:   Lab Tests: Ordered and Reviewed       <> Summary of Lab: Covid neg  Urgent Care Management:  Pt is complaining of sore throat, cough, congestion. Pt symptoms started today. Pt has not taking any OTC medications.  103 temp on wednesday and temp today was 100. No sick contact. He states that his partner is sick.   Vitals WNL.  PE was unremarkable. Pt likely has URI. Pt was sent home with cough medication to take as needed. ED and return precautions were given. The pt vu.

## 2024-08-23 NOTE — PATIENT INSTRUCTIONS
Sore throat Mixture  5 ML Children's Tylenol  5 mL Liquid Benedryl  10 mL Maalox    Mix together in a cup; gargle then swallow  May be used every 4 to 6 hours       General Instructions for Upper Respiratory Infection (URI):     Alternate Tylenol and Ibuprofen every 3 hrs for fever, pain and inflammation.   Avoid NSAIDs (Ibuprofen, Aleve, Motrin, Aspirin) if you are pregnant, or have advanced kidney disease or history of stomach ulcers/bleeding.     Sore throat/Post Nasal Drip:  Salt water gargles, chloraseptic spray, lozenges, or cough drops   Honey/lemon water or warm tea   Cepachol   Zantac will help if there is reflux from the post nasal drip and helpful to take at night     Sinus Congestion/Runny nose:  Zyrtec/Claritin/Allegra during the day and Benadryl at night as needed  Mucinex, Dayquil, or Coricidin   If you DO NOT have Hypertension (high blood pressure) or any history of palpitations, it is ok to take over the counter Sudafed or Mucinex D or Allegra-D or Claritin-D or Zyrtec-D.  If you do take one of the above, it is ok to combine that with plain over the counter Mucinex or Allegra or Claritin or Zyrtec. If, for example, you are taking Zyrtec -D, you can combine that with Mucinex, but not Mucinex-D. If you are taking Mucinex-D, you can combine that with plain Allegra or Claritin or Zyrtec.  If you DO have Hypertension or palpitations, it is safe to take Coricidin HBP for relief of sinus symptoms.  Nasal saline spray reduces inflammation and dryness  Flonase OTC or Nasacort OTC to help decrease inflammation in nasal turbinates and allow sinuses to drain  Warm face compresses/hot showers as often as you can to open sinuses and allow to drain.   Vicks vapor rub and/or humidifier at night  Cold-eeze helps to reduce the duration of URI symptoms if taken early  Elderberry, Emergen-C, and/or Zinc to reduce duration of viral URI symptoms    Cough:  Robitussin or Delsym as needed  Cough drops  Vicks vapor rub  and/or humidifier at night       Rest as much as you can     Your symptoms are likely viral and will typically last 7-10 days, maybe longer depending on how it affects your body.  You are contagious until day 5-7, so minimize contact with others to reduce the spread to others and stay home from work or school as we discussed. Dehydration is preventable but is one of the main reasons why you will feel so badly. Drink pedialyte, gatorade or propel. Stay hydrated.  Antibiotics are not needed unless a complication( such as Otitis Media, Bacterial sinus infection or pneumonia) develops. Taking antibiotics for Flu/Cold is not supported by evidence-based medicine and can expose you to unnecessary side effects of the medication, such as anaphylaxis, yeast infection and leads to antibiotic resistance.     Please follow up with your primary care provider within 5-7 days if your signs and symptoms have not resolved or worsen.  If your condition worsens or fails to improve we recommend that you receive another evaluation at the emergency  room immediately or contact your primary medical clinic to discuss your concerns.  You must understand that you have received an Urgent Care treatment only and that you may be released before all of  your medical problems are known or treated. You, the patient, will arrange for follow up care as instructed.    Go to Emergency Room immediately if you experience any:  Chest pain, shortness of breath, wheezing or difficulty breathing,  Severe headache, face, neck or ear pain,  New rash,  Fever over 101.5º F (38.6 C) for more than three days,  Confusion, behavior change or seizure,  Severe weakness or dizziness or passing out

## 2024-09-17 DIAGNOSIS — E87.6 HYPOKALEMIA: Chronic | ICD-10-CM

## 2024-09-19 RX ORDER — POTASSIUM CHLORIDE 20 MEQ/1
20 TABLET, EXTENDED RELEASE ORAL
Qty: 90 TABLET | Refills: 3 | Status: SHIPPED | OUTPATIENT
Start: 2024-09-19

## 2024-10-14 ENCOUNTER — PATIENT MESSAGE (OUTPATIENT)
Dept: UROLOGY | Facility: CLINIC | Age: 59
End: 2024-10-14
Payer: COMMERCIAL

## 2024-10-14 DIAGNOSIS — R39.198 DIFFICULTY VOIDING: ICD-10-CM

## 2024-10-15 RX ORDER — TAMSULOSIN HYDROCHLORIDE 0.4 MG/1
1 CAPSULE ORAL DAILY
Qty: 90 CAPSULE | Refills: 0 | Status: SHIPPED | OUTPATIENT
Start: 2024-10-15

## 2024-10-18 ENCOUNTER — OFFICE VISIT (OUTPATIENT)
Dept: UROLOGY | Facility: CLINIC | Age: 59
End: 2024-10-18
Payer: COMMERCIAL

## 2024-10-18 VITALS
RESPIRATION RATE: 18 BRPM | OXYGEN SATURATION: 98 % | WEIGHT: 138 LBS | HEART RATE: 97 BPM | BODY MASS INDEX: 19.32 KG/M2 | DIASTOLIC BLOOD PRESSURE: 80 MMHG | SYSTOLIC BLOOD PRESSURE: 122 MMHG | HEIGHT: 71 IN

## 2024-10-18 DIAGNOSIS — N40.1 BPH WITH URINARY OBSTRUCTION: Primary | ICD-10-CM

## 2024-10-18 DIAGNOSIS — R31.29 HEMATURIA, MICROSCOPIC: ICD-10-CM

## 2024-10-18 DIAGNOSIS — N13.8 BPH WITH URINARY OBSTRUCTION: Primary | ICD-10-CM

## 2024-10-18 DIAGNOSIS — N40.0 ENLARGED PROSTATE: ICD-10-CM

## 2024-10-18 LAB
BILIRUBIN, UA POC OHS: NEGATIVE
BLOOD, UA POC OHS: ABNORMAL
CLARITY, UA POC OHS: CLEAR
COLOR, UA POC OHS: YELLOW
GLUCOSE, UA POC OHS: NEGATIVE
KETONES, UA POC OHS: ABNORMAL
LEUKOCYTES, UA POC OHS: NEGATIVE
NITRITE, UA POC OHS: NEGATIVE
PH, UA POC OHS: 7.5
POC RESIDUAL URINE VOLUME: 57 ML (ref 0–100)
PROTEIN, UA POC OHS: NEGATIVE
SPECIFIC GRAVITY, UA POC OHS: 1.02
UROBILINOGEN, UA POC OHS: 0.2

## 2024-10-18 RX ORDER — LANOLIN ALCOHOL/MO/W.PET/CERES
400 CREAM (GRAM) TOPICAL DAILY
COMMUNITY

## 2024-10-18 NOTE — PROGRESS NOTES
Patient was with spouse during consent consultation on today. Patient was instructed on TURP procedure and verbalized understanding. Patient was given lab paperwork to have labs drawn 3 days prior to procedure.

## 2024-10-18 NOTE — PROGRESS NOTES
Subjective:       Patient ID: Jose Acosta is a 59 y.o. male.    Chief Complaint: No chief complaint on file.      HPI: 59-year-old male, established patient, presents for 6 month visit.    Patient has history BPH with obstruction and an enlarged prostate.  He was on Flomax 0.4 mg daily.    Patient had a workup for microscopic hematuria.    He had a CT in July 2023 showed some lateral cyst.    He had a cystoscopy done in May 2024 showed an enlarged prostate.  Dr. Lindsey recommended a TURP.    Patient was going to proceed with a TURP.  He was scheduled in October of this year.  However, he developed respiratory infection and had to cancel the procedure.  Patient also has a history of prostatitis.      At this time patient states he is doing well.  Denies any pain or burning urination.  Denies any odor urine.  Denies any fever or body aches.  Denies any difficulty voiding.  States he has a pretty good stream from start to finish.  Denies any significant frequency, urgency, or nocturia.  Denies seeing any blood in urine denies any unexpected weight loss.      PSA was done in May and was normal at 1.14.      No other urinary complaints at this time.         Past Medical History:   Past Medical History:   Diagnosis Date    Allergy     BPH (benign prostatic hyperplasia)     Chronic constipation     Hypochloremia        Past Surgical Historical:   Past Surgical History:   Procedure Laterality Date    right great toe fusion      plate with screws placed    TONSILLECTOMY          Medications:   Medication List with Changes/Refills   Current Medications    BROMPHENIRAMINE-PSEUDOEPH-DM (BROMFED DM) 2-30-10 MG/5 ML SYRP    Take 5-10 mLs by mouth every 6 to 8 hours as needed (cough).    CELECOXIB (CELEBREX) 100 MG CAPSULE    Take 1 capsule (100 mg total) by mouth 2 (two) times daily.    CYCLOBENZAPRINE (FLEXERIL) 10 MG TABLET    Take 1 tablet (10 mg total) by mouth 2 (two) times daily as needed for Muscle spasms.    LINACLOTIDE  (LINZESS) 290 MCG CAP CAPSULE    Take 1 capsule (290 mcg total) by mouth once daily.    MAGNESIUM OXIDE (MAG-OX) 400 MG (241.3 MG MAGNESIUM) TABLET    Take 400 mg by mouth once daily.    POTASSIUM CHLORIDE SA (K-DUR,KLOR-CON) 20 MEQ TABLET    TAKE 1 TABLET BY MOUTH EVERY DAY    TAMSULOSIN (FLOMAX) 0.4 MG CAP    Take 1 capsule (0.4 mg total) by mouth once daily.        Past Social History:   Social History     Socioeconomic History    Marital status:    Occupational History    Occupation:    Tobacco Use    Smoking status: Never     Passive exposure: Never    Smokeless tobacco: Never   Substance and Sexual Activity    Alcohol use: Not Currently     Comment: rarely    Drug use: Not Currently     Social Drivers of Health     Financial Resource Strain: Low Risk  (3/6/2024)    Overall Financial Resource Strain (CARDIA)     Difficulty of Paying Living Expenses: Not very hard   Food Insecurity: No Food Insecurity (3/6/2024)    Hunger Vital Sign     Worried About Running Out of Food in the Last Year: Never true     Ran Out of Food in the Last Year: Never true   Transportation Needs: No Transportation Needs (3/6/2024)    PRAPARE - Transportation     Lack of Transportation (Medical): No     Lack of Transportation (Non-Medical): No   Physical Activity: Sufficiently Active (3/6/2024)    Exercise Vital Sign     Days of Exercise per Week: 4 days     Minutes of Exercise per Session: 40 min   Stress: No Stress Concern Present (3/6/2024)    [employer] [place] [employer] - Occupational Stress Questionnaire     Feeling of Stress : Not at all   Housing Stability: Low Risk  (3/6/2024)    Housing Stability Vital Sign     Unable to Pay for Housing in the Last Year: No     Number of Places Lived in the Last Year: 1     Unstable Housing in the Last Year: No       Allergies:   Review of patient's allergies indicates:   Allergen Reactions    Codeine         Family History:   Family History   Problem Relation  Name Age of Onset    No Known Problems Mother      Cancer Father          Review of Systems:  Review of Systems   Constitutional:  Negative for activity change and appetite change.   HENT:  Negative for congestion and dental problem.    Eyes:  Negative for visual disturbance.   Respiratory:  Negative for chest tightness and shortness of breath.    Cardiovascular:  Negative for chest pain.   Gastrointestinal:  Negative for abdominal distention and abdominal pain.   Genitourinary:  Negative for decreased urine volume, difficulty urinating, dysuria, enuresis, flank pain, frequency, genital sores, hematuria, penile discharge, penile pain, penile swelling, scrotal swelling, testicular pain and urgency.   Musculoskeletal:  Negative for back pain and neck pain.   Skin:  Negative for color change.   Neurological:  Negative for dizziness.   Hematological:  Negative for adenopathy.   Psychiatric/Behavioral:  Negative for agitation, behavioral problems and confusion.        Physical Exam:  Physical Exam  Vitals and nursing note reviewed.   Constitutional:       Appearance: He is well-developed.   HENT:      Head: Normocephalic.   Eyes:      Pupils: Pupils are equal, round, and reactive to light.   Cardiovascular:      Rate and Rhythm: Normal rate and regular rhythm.      Heart sounds: Normal heart sounds.   Pulmonary:      Effort: Pulmonary effort is normal.      Breath sounds: Normal breath sounds.   Abdominal:      General: Bowel sounds are normal.      Palpations: Abdomen is soft.   Musculoskeletal:         General: Normal range of motion.      Cervical back: Normal range of motion and neck supple.   Skin:     General: Skin is warm and dry.   Neurological:      Mental Status: He is alert and oriented to person, place, and time.   Psychiatric:         Behavior: Behavior normal.       Urinalysis: Trace intact blood, red blood cells 3-5.    Bladder scan:  57 cc    Assessment/Plan:   1. BPH with obstruction/enlarged prostate:   Patient was rated proceed with TURP.    Will schedule patient for TURP with Dr. Lindsey.      2. Microscopic hematuria:  Trace blood with red blood cells 3-5 noted today.    Patient had a negative workup in 2020 03/2024.    Follow-up to be arranged.  Problem List Items Addressed This Visit    None  Visit Diagnoses       BPH with urinary obstruction    -  Primary    Relevant Orders    POCT Urinalysis(Instrument)    POCT Bladder Scan (Completed)    Hematuria, microscopic        Relevant Orders    POCT Urinalysis(Instrument)    POCT Bladder Scan (Completed)    Enlarged prostate

## 2024-11-01 LAB
ANION GAP SERPL CALC-SCNC: 2 MMOL/L (ref 3–11)
APPEARANCE, UA: CLEAR
BASOPHILS NFR BLD: 1.1 % (ref 0–3)
BILIRUB UR QL STRIP: NEGATIVE MG/DL
BUN SERPL-MCNC: 10 MG/DL (ref 7–18)
BUN/CREAT SERPL: 9.34 RATIO (ref 7–18)
CALCIUM SERPL-MCNC: 8.8 MG/DL (ref 8.8–10.5)
CHLORIDE SERPL-SCNC: 103 MMOL/L (ref 100–108)
CO2 SERPL-SCNC: 34 MMOL/L (ref 21–32)
COLOR UR: ABNORMAL
CREAT SERPL-MCNC: 1.07 MG/DL (ref 0.7–1.3)
EOSINOPHIL NFR BLD: 7.7 % (ref 1–3)
ERYTHROCYTE [DISTWIDTH] IN BLOOD BY AUTOMATED COUNT: 13.1 % (ref 12.5–18)
GFR ESTIMATION: 81
GLUCOSE (UA): NORMAL MG/DL
GLUCOSE SERPL-MCNC: 91 MG/DL (ref 70–110)
HCT VFR BLD AUTO: 49.4 % (ref 42–52)
HGB BLD-MCNC: 16 G/DL (ref 14–18)
HGB UR QL STRIP: 10 /UL
KETONES UR QL STRIP: NEGATIVE MG/DL
LEUKOCYTE ESTERASE UR QL STRIP: NEGATIVE /UL
LYMPHOCYTES NFR BLD: 30.3 % (ref 25–40)
MCH RBC QN AUTO: 29.5 PG (ref 27–31.2)
MCHC RBC AUTO-ENTMCNC: 32.4 G/DL (ref 31.8–35.4)
MCV RBC AUTO: 91.1 FL (ref 80–97)
MONOCYTES NFR BLD: 10 % (ref 1–15)
NEUTROPHILS # BLD AUTO: 2.84 10*3/UL (ref 1.8–7.7)
NEUTROPHILS NFR BLD: 50.5 % (ref 37–80)
NITRITE UR QL STRIP: NEGATIVE
NUCLEATED RED BLOOD CELLS: 0 %
PH UR STRIP: 8 PH (ref 5–8)
PLATELETS: 261 10*3/UL (ref 142–424)
POTASSIUM SERPL-SCNC: 4.5 MMOL/L (ref 3.6–5.2)
PROT UR QL STRIP: NEGATIVE MG/DL
RBC # BLD AUTO: 5.42 10*6/UL (ref 4.7–6.1)
SODIUM BLD-SCNC: 139 MMOL/L (ref 135–145)
SP GR UR STRIP: 1.01 (ref 1–1.03)
SPECIMEN COLLECTION METHOD, URINE: ABNORMAL
UROBILINOGEN UR STRIP-ACNC: NORMAL MG/DL
WBC # BLD: 5.6 10*3/UL (ref 4.6–10.2)

## 2024-11-06 ENCOUNTER — TELEPHONE (OUTPATIENT)
Dept: UROLOGY | Facility: CLINIC | Age: 59
End: 2024-11-06

## 2024-11-06 ENCOUNTER — PATIENT MESSAGE (OUTPATIENT)
Dept: UROLOGY | Facility: CLINIC | Age: 59
End: 2024-11-06

## 2024-11-06 ENCOUNTER — OUTSIDE PLACE OF SERVICE (OUTPATIENT)
Dept: UROLOGY | Facility: CLINIC | Age: 59
End: 2024-11-06

## 2024-11-06 DIAGNOSIS — N40.1 BPH WITH URINARY OBSTRUCTION: Primary | ICD-10-CM

## 2024-11-06 DIAGNOSIS — N13.8 BPH WITH URINARY OBSTRUCTION: Primary | ICD-10-CM

## 2024-11-06 RX ORDER — CIPROFLOXACIN 500 MG/1
500 TABLET ORAL 2 TIMES DAILY
Qty: 6 TABLET | Refills: 0 | Status: SHIPPED | OUTPATIENT
Start: 2024-11-06 | End: 2024-11-09

## 2024-11-06 RX ORDER — HYDROCODONE BITARTRATE AND ACETAMINOPHEN 7.5; 325 MG/1; MG/1
1 TABLET ORAL EVERY 6 HOURS PRN
Qty: 15 TABLET | Refills: 0 | Status: SHIPPED | OUTPATIENT
Start: 2024-11-06

## 2024-11-06 NOTE — TELEPHONE ENCOUNTER
Patient stated due to his hypokalemia he does not want to take Cipro after reading warning labels. Advised provider will address in AM

## 2024-11-06 NOTE — TELEPHONE ENCOUNTER
----- Message from [place] sent at 11/6/2024  4:46 PM CST -----  Contact: self  ciprofloxacin HCl (CIPRO) 500 MG tablet  Type:  Patient Returning Call    Who Called:Jose Acosta  Who Left Message for Patient:unsure  Does the patient know what this is regarding?:ciprofloxacin HCl (CIPRO) 500 MG tablet  Would the patient rather a call back or a response via [facility]?   Best Call Back Number:[phone]  Additional Information: pt can't take this medication due to other underlying conditions    [id number] - [place], [place] - [address] AT [facility]  [address]  [place] [address]  Phone: [phone] Fax: [phone]

## 2024-11-07 ENCOUNTER — CLINICAL SUPPORT (OUTPATIENT)
Dept: UROLOGY | Facility: CLINIC | Age: 59
End: 2024-11-07
Payer: COMMERCIAL

## 2024-11-07 DIAGNOSIS — N40.1 BPH WITH URINARY OBSTRUCTION: Primary | ICD-10-CM

## 2024-11-07 DIAGNOSIS — N13.8 BPH WITH URINARY OBSTRUCTION: Primary | ICD-10-CM

## 2024-11-07 RX ORDER — CEFDINIR 300 MG/1
300 CAPSULE ORAL 2 TIMES DAILY
Qty: 14 CAPSULE | Refills: 0 | Status: SHIPPED | OUTPATIENT
Start: 2024-11-07 | End: 2024-11-14

## 2024-11-07 RX ORDER — PHENAZOPYRIDINE HYDROCHLORIDE 200 MG/1
200 TABLET, FILM COATED ORAL 3 TIMES DAILY PRN
Qty: 9 TABLET | Refills: 0 | Status: SHIPPED | OUTPATIENT
Start: 2024-11-07 | End: 2024-11-17

## 2024-11-07 NOTE — PROGRESS NOTES
Pt in clinic for CATH DC, 30CC deflated from balloon. Cath removed, tip attached. Pt tolerated well. BJP

## 2024-11-21 ENCOUNTER — PATIENT MESSAGE (OUTPATIENT)
Dept: UROLOGY | Facility: CLINIC | Age: 59
End: 2024-11-21
Payer: COMMERCIAL

## 2024-11-27 ENCOUNTER — PATIENT MESSAGE (OUTPATIENT)
Dept: UROLOGY | Facility: CLINIC | Age: 59
End: 2024-11-27
Payer: COMMERCIAL

## 2024-12-05 ENCOUNTER — PATIENT MESSAGE (OUTPATIENT)
Dept: UROLOGY | Facility: CLINIC | Age: 59
End: 2024-12-05

## 2024-12-05 ENCOUNTER — OFFICE VISIT (OUTPATIENT)
Dept: UROLOGY | Facility: CLINIC | Age: 59
End: 2024-12-05
Payer: COMMERCIAL

## 2024-12-05 VITALS
WEIGHT: 138 LBS | BODY MASS INDEX: 19.32 KG/M2 | SYSTOLIC BLOOD PRESSURE: 132 MMHG | HEART RATE: 101 BPM | HEIGHT: 71 IN | DIASTOLIC BLOOD PRESSURE: 83 MMHG

## 2024-12-05 DIAGNOSIS — N40.1 BPH WITH OBSTRUCTION/LOWER URINARY TRACT SYMPTOMS: Primary | ICD-10-CM

## 2024-12-05 DIAGNOSIS — N13.8 BPH WITH OBSTRUCTION/LOWER URINARY TRACT SYMPTOMS: Primary | ICD-10-CM

## 2024-12-05 PROCEDURE — 1160F RVW MEDS BY RX/DR IN RCRD: CPT | Mod: CPTII,,, | Performed by: UROLOGY

## 2024-12-05 PROCEDURE — 1159F MED LIST DOCD IN RCRD: CPT | Mod: CPTII,,, | Performed by: UROLOGY

## 2024-12-05 PROCEDURE — 3075F SYST BP GE 130 - 139MM HG: CPT | Mod: CPTII,,, | Performed by: UROLOGY

## 2024-12-05 PROCEDURE — 3079F DIAST BP 80-89 MM HG: CPT | Mod: CPTII,,, | Performed by: UROLOGY

## 2024-12-05 PROCEDURE — 99024 POSTOP FOLLOW-UP VISIT: CPT | Mod: ,,, | Performed by: UROLOGY

## 2024-12-05 PROCEDURE — 3044F HG A1C LEVEL LT 7.0%: CPT | Mod: CPTII,,, | Performed by: UROLOGY

## 2024-12-05 RX ORDER — CEFDINIR 300 MG/1
300 CAPSULE ORAL 2 TIMES DAILY
Qty: 28 CAPSULE | Refills: 0 | Status: SHIPPED | OUTPATIENT
Start: 2024-12-05 | End: 2024-12-19

## 2024-12-06 RX ORDER — CEFDINIR 300 MG/1
300 CAPSULE ORAL 2 TIMES DAILY
Qty: 32 CAPSULE | Refills: 0 | Status: SHIPPED | OUTPATIENT
Start: 2024-12-06 | End: 2024-12-22

## 2025-01-15 DIAGNOSIS — R39.198 DIFFICULTY VOIDING: ICD-10-CM

## 2025-01-15 RX ORDER — TAMSULOSIN HYDROCHLORIDE 0.4 MG/1
1 CAPSULE ORAL
Qty: 90 CAPSULE | Refills: 0 | Status: SHIPPED | OUTPATIENT
Start: 2025-01-15

## 2025-03-10 ENCOUNTER — PATIENT MESSAGE (OUTPATIENT)
Dept: UROLOGY | Facility: CLINIC | Age: 60
End: 2025-03-10
Payer: COMMERCIAL

## 2025-03-11 ENCOUNTER — OFFICE VISIT (OUTPATIENT)
Dept: UROLOGY | Facility: CLINIC | Age: 60
End: 2025-03-11
Payer: COMMERCIAL

## 2025-03-11 VITALS
SYSTOLIC BLOOD PRESSURE: 129 MMHG | HEIGHT: 71 IN | WEIGHT: 138 LBS | BODY MASS INDEX: 19.32 KG/M2 | HEART RATE: 97 BPM | DIASTOLIC BLOOD PRESSURE: 87 MMHG

## 2025-03-11 DIAGNOSIS — N41.9 PROSTATITIS, UNSPECIFIED PROSTATITIS TYPE: Primary | ICD-10-CM

## 2025-03-11 LAB
BILIRUBIN, UA POC OHS: NEGATIVE
BLOOD, UA POC OHS: ABNORMAL
CLARITY, UA POC OHS: ABNORMAL
COLOR, UA POC OHS: YELLOW
GLUCOSE, UA POC OHS: NEGATIVE
KETONES, UA POC OHS: 40
LEUKOCYTES, UA POC OHS: NEGATIVE
NITRITE, UA POC OHS: NEGATIVE
PH, UA POC OHS: 6
PROTEIN, UA POC OHS: NEGATIVE
SPECIFIC GRAVITY, UA POC OHS: 1.01
UROBILINOGEN, UA POC OHS: 0.2

## 2025-03-11 PROCEDURE — 99214 OFFICE O/P EST MOD 30 MIN: CPT | Mod: S$PBB,,, | Performed by: UROLOGY

## 2025-03-11 PROCEDURE — 3008F BODY MASS INDEX DOCD: CPT | Mod: CPTII,,, | Performed by: UROLOGY

## 2025-03-11 PROCEDURE — 1159F MED LIST DOCD IN RCRD: CPT | Mod: CPTII,,, | Performed by: UROLOGY

## 2025-03-11 PROCEDURE — 3079F DIAST BP 80-89 MM HG: CPT | Mod: CPTII,,, | Performed by: UROLOGY

## 2025-03-11 PROCEDURE — 3074F SYST BP LT 130 MM HG: CPT | Mod: CPTII,,, | Performed by: UROLOGY

## 2025-03-11 RX ORDER — AMOXICILLIN AND CLAVULANATE POTASSIUM 875; 125 MG/1; MG/1
TABLET, FILM COATED ORAL
COMMUNITY
Start: 2025-03-10

## 2025-03-11 RX ORDER — AZELASTINE 1 MG/ML
SPRAY, METERED NASAL
COMMUNITY
Start: 2025-03-10

## 2025-03-11 NOTE — PROGRESS NOTES
Subjective:       Patient ID: Jose Acosta is a 59 y.o. male.    Chief Complaint: No chief complaint on file.      HPI:  59-year-old male patient presenting to clinic for follow-up for recent prostatitis.  Patient was having prostatitis symptoms however is put on antibiotics and doing better.    Past Medical History:   Past Medical History:   Diagnosis Date    Allergy     BPH (benign prostatic hyperplasia)     Chronic constipation     Hypochloremia        Past Surgical Historical:   Past Surgical History:   Procedure Laterality Date    right great toe fusion      plate with screws placed    TONSILLECTOMY      TRANSURETHRAL RESECTION OF PROSTATE  11/06/2024    Dr. Lindsey        Medications:   Medication List with Changes/Refills   Current Medications    AMOXICILLIN-CLAVULANATE 875-125MG (AUGMENTIN) 875-125 MG PER TABLET        AZELASTINE (ASTELIN) 137 MCG (0.1 %) NASAL SPRAY        BROMPHENIRAMINE-PSEUDOEPH-DM (BROMFED DM) 2-30-10 MG/5 ML SYRP    Take 5-10 mLs by mouth every 6 to 8 hours as needed (cough).    CELECOXIB (CELEBREX) 100 MG CAPSULE    Take 1 capsule (100 mg total) by mouth 2 (two) times daily.    CYCLOBENZAPRINE (FLEXERIL) 10 MG TABLET    Take 1 tablet (10 mg total) by mouth 2 (two) times daily as needed for Muscle spasms.    HYDROCODONE-ACETAMINOPHEN (NORCO) 7.5-325 MG PER TABLET    Take 1 tablet by mouth every 6 (six) hours as needed for Pain.    LINACLOTIDE (LINZESS) 290 MCG CAP CAPSULE    Take 1 capsule (290 mcg total) by mouth once daily.    MAGNESIUM OXIDE (MAG-OX) 400 MG (241.3 MG MAGNESIUM) TABLET    Take 400 mg by mouth once daily.    POTASSIUM CHLORIDE SA (K-DUR,KLOR-CON) 20 MEQ TABLET    TAKE 1 TABLET BY MOUTH EVERY DAY    TAMSULOSIN (FLOMAX) 0.4 MG CAP    TAKE 1 CAPSULE BY MOUTH EVERY DAY        Past Social History: Social History[1]    Allergies:   Review of patient's allergies indicates:   Allergen Reactions    Codeine         Family History:   Family History   Problem Relation Name Age of  Onset    No Known Problems Mother      Cancer Father          Review of Systems:  Review of Systems   Constitutional:  Negative for activity change and appetite change.   HENT:  Negative for congestion and dental problem.    Eyes:  Negative for visual disturbance.   Respiratory:  Negative for chest tightness and shortness of breath.    Cardiovascular:  Negative for chest pain.   Gastrointestinal:  Negative for abdominal distention and abdominal pain.   Genitourinary:  Negative for decreased urine volume, difficulty urinating, dysuria, enuresis, flank pain, frequency, genital sores, hematuria, penile discharge, penile pain, penile swelling, scrotal swelling, testicular pain and urgency.   Musculoskeletal:  Negative for back pain and neck pain.   Skin:  Negative for color change.   Neurological:  Negative for dizziness.   Hematological:  Negative for adenopathy.   Psychiatric/Behavioral:  Negative for agitation, behavioral problems and confusion.        Physical Exam:  Physical Exam  Constitutional:       General: He is not in acute distress.     Appearance: He is well-developed.   HENT:      Head: Normocephalic and atraumatic.      Nose: Nose normal.   Eyes:      Pupils: Pupils are equal, round, and reactive to light.   Cardiovascular:      Rate and Rhythm: Normal rate and regular rhythm.   Pulmonary:      Effort: Pulmonary effort is normal. No respiratory distress.   Abdominal:      General: There is no distension.      Palpations: Abdomen is soft.   Musculoskeletal:         General: No deformity. Normal range of motion.      Cervical back: Normal range of motion.   Skin:     General: Skin is warm and dry.   Neurological:      Mental Status: He is alert and oriented to person, place, and time.   Psychiatric:         Behavior: Behavior normal.         Assessment/Plan:     Prostatitis:  Patient is treated 1 month of Omnicef.  Doing better after antibiotic therapy.  Follow up as needed  Problem List Items Addressed  This Visit    None                  [1]   Social History  Socioeconomic History    Marital status:    Occupational History    Occupation:    Tobacco Use    Smoking status: Never     Passive exposure: Never    Smokeless tobacco: Never   Substance and Sexual Activity    Alcohol use: Not Currently     Comment: rarely    Drug use: Not Currently     Social Drivers of Health     Financial Resource Strain: Low Risk  (3/6/2024)    Overall Financial Resource Strain (CARDIA)     Difficulty of Paying Living Expenses: Not very hard   Food Insecurity: No Food Insecurity (3/6/2024)    Hunger Vital Sign     Worried About Running Out of Food in the Last Year: Never true     Ran Out of Food in the Last Year: Never true   Transportation Needs: No Transportation Needs (3/6/2024)    PRAPARE - Transportation     Lack of Transportation (Medical): No     Lack of Transportation (Non-Medical): No   Physical Activity: Sufficiently Active (3/6/2024)    Exercise Vital Sign     Days of Exercise per Week: 4 days     Minutes of Exercise per Session: 40 min   Stress: No Stress Concern Present (3/6/2024)    [employer] [place] [employer] - Occupational Stress Questionnaire     Feeling of Stress : Not at all   Housing Stability: Low Risk  (3/6/2024)    Housing Stability Vital Sign     Unable to Pay for Housing in the Last Year: No     Number of Places Lived in the Last Year: 1     Unstable Housing in the Last Year: No

## 2025-03-26 ENCOUNTER — OFFICE VISIT (OUTPATIENT)
Dept: FAMILY MEDICINE | Facility: CLINIC | Age: 60
End: 2025-03-26
Payer: COMMERCIAL

## 2025-03-26 VITALS
DIASTOLIC BLOOD PRESSURE: 62 MMHG | RESPIRATION RATE: 16 BRPM | TEMPERATURE: 99 F | BODY MASS INDEX: 19.04 KG/M2 | SYSTOLIC BLOOD PRESSURE: 110 MMHG | OXYGEN SATURATION: 99 % | HEART RATE: 87 BPM | HEIGHT: 71 IN | WEIGHT: 136 LBS

## 2025-03-26 DIAGNOSIS — Z23 NEED FOR PROPHYLACTIC VACCINATION WITH STREPTOCOCCUS PNEUMONIAE (PNEUMOCOCCUS) AND INFLUENZA VACCINES: ICD-10-CM

## 2025-03-26 DIAGNOSIS — E78.2 MIXED HYPERLIPIDEMIA: Chronic | ICD-10-CM

## 2025-03-26 DIAGNOSIS — Z00.00 ROUTINE ADULT HEALTH MAINTENANCE: Primary | ICD-10-CM

## 2025-03-26 PROCEDURE — 3074F SYST BP LT 130 MM HG: CPT | Mod: CPTII,,, | Performed by: NURSE PRACTITIONER

## 2025-03-26 PROCEDURE — 1160F RVW MEDS BY RX/DR IN RCRD: CPT | Mod: CPTII,,, | Performed by: NURSE PRACTITIONER

## 2025-03-26 PROCEDURE — 99396 PREV VISIT EST AGE 40-64: CPT | Mod: 25,S$PBB,, | Performed by: NURSE PRACTITIONER

## 2025-03-26 PROCEDURE — 3078F DIAST BP <80 MM HG: CPT | Mod: CPTII,,, | Performed by: NURSE PRACTITIONER

## 2025-03-26 PROCEDURE — 1159F MED LIST DOCD IN RCRD: CPT | Mod: CPTII,,, | Performed by: NURSE PRACTITIONER

## 2025-03-26 PROCEDURE — 3008F BODY MASS INDEX DOCD: CPT | Mod: CPTII,,, | Performed by: NURSE PRACTITIONER

## 2025-03-26 NOTE — PROGRESS NOTES
Subjective:       Patient ID: Jose Acosta is a 59 y.o. male.    Chief Complaint: Follow-up (Pt is here for a follow up complains of back pain )    Here for annual wellness exam. He is due for annual lipid check, we have been monitoring his elevated cholesterol and LDL. Last ASCVD score 5.0%; He stopped atorvastatin 10 mg hs....but he is willing to resume this if needed.  He is UTD with colorectal screen; next screen due 8/2026; UTD w/ Tdap-- given in 2023. He is agreeable to receive PCV 20 today.     Diagnosed w/ chronic cystitis and prostatitis. He completed 30 day course Omnicef. He reports intermittent flank pain; per his account, urology consider abd/pelvic CT to eval for kidney stones.     Hyperlipidemia    Type of hyperlipidemia: combined  Duration: chronic  Control: usually well controlled; at goal  Compliance: compliant; compliant with diet; exercises  Complications: no coronary artery disease; no cerebral vascular disease, no peripheral artery disease  ASCVD:The 10-year ASCVD risk score (Lyudmila GUIDO, et al., 2019) is: 5%    Values used to calculate the score:      Age: 59 years      Sex: Male      Is Non- : No      Diabetic: No      Tobacco smoker: No      Systolic Blood Pressure: 110 mmHg      Is BP treated: No      HDL Cholesterol: 54 mg/dL      Total Cholesterol: 205 mg/dL         Review of Systems   Constitutional:  Negative for chills, fatigue and fever.   Respiratory:  Negative for cough, shortness of breath and wheezing.    Cardiovascular:  Negative for chest pain and palpitations.   Gastrointestinal:  Negative for abdominal pain, nausea and vomiting.   Genitourinary:  Positive for flank pain.   Musculoskeletal:  Negative for myalgias.   Neurological:  Negative for dizziness, light-headedness and headaches.           Past Medical History:  Past Medical History:   Diagnosis Date    Allergy     BPH (benign prostatic hyperplasia)     Chronic constipation     Hypochloremia        Past Surgical History:   Procedure Laterality Date    right great toe fusion      plate with screws placed    TONSILLECTOMY      TRANSURETHRAL RESECTION OF PROSTATE  11/06/2024    Dr. Lindsey      Review of patient's allergies indicates:   Allergen Reactions    Codeine       Current Medications[1]  Social History[2]   Family History   Problem Relation Name Age of Onset    No Known Problems Mother      Cancer Father          Objective:      Physical Exam  Constitutional:       Appearance: He is well-developed.   HENT:      Head: Normocephalic and atraumatic.      Mouth/Throat:      Mouth: Mucous membranes are moist.      Pharynx: Oropharynx is clear.   Eyes:      General: No scleral icterus.     Conjunctiva/sclera: Conjunctivae normal.   Neck:      Trachea: Trachea normal.   Cardiovascular:      Rate and Rhythm: Normal rate and regular rhythm.   Pulmonary:      Effort: Pulmonary effort is normal.      Breath sounds: Normal breath sounds.   Musculoskeletal:      Cervical back: Normal range of motion and neck supple.   Neurological:      Mental Status: He is alert and oriented to person, place, and time.   Psychiatric:         Mood and Affect: Mood normal.         Speech: Speech normal.         Behavior: Behavior normal.         Assessment:     1. Routine adult health maintenance    2. Mixed hyperlipidemia Active   3. Need for prophylactic vaccination with Streptococcus pneumoniae (Pneumococcus) and Influenza vaccines      Plan:       PROBLEM LIST     Routine adult health maintenance  Comments:  obtaining labs including lipid panel; previous ASCVD score 5.0%; he is agreeable in resuming atorvastatin if needed.    Mixed hyperlipidemia  -     CBC Auto Differential  -     Comprehensive Metabolic Panel  -     Lipid Panel  -     TSH w/reflex to FT4    Need for prophylactic vaccination with Streptococcus pneumoniae (Pneumococcus) and Influenza vaccines  -     pneumoc 20-benjamin conj-dip cr(PF) (PREVNAR-20 (PF)) injection Syrg  0.5 mL               [1]   Current Outpatient Medications   Medication Sig Dispense Refill    cyclobenzaprine (FLEXERIL) 10 MG tablet Take 1 tablet (10 mg total) by mouth 2 (two) times daily as needed for Muscle spasms. 60 tablet 5    magnesium oxide (MAG-OX) 400 mg (241.3 mg magnesium) tablet Take 400 mg by mouth once daily.      potassium chloride SA (K-DUR,KLOR-CON) 20 MEQ tablet TAKE 1 TABLET BY MOUTH EVERY DAY 90 tablet 3    tamsulosin (FLOMAX) 0.4 mg Cap TAKE 1 CAPSULE BY MOUTH EVERY DAY 90 capsule 0    azelastine (ASTELIN) 137 mcg (0.1 %) nasal spray       celecoxib (CELEBREX) 100 MG capsule Take 1 capsule (100 mg total) by mouth 2 (two) times daily. 60 capsule 5     Current Facility-Administered Medications   Medication Dose Route Frequency Provider Last Rate Last Admin    pneumoc 20-benjamin conj-dip cr(PF) (PREVNAR-20 (PF)) injection Syrg 0.5 mL  0.5 mL Intramuscular 1 time in Clinic/HOD        [2]   Social History  Socioeconomic History    Marital status:    Occupational History    Occupation:    Tobacco Use    Smoking status: Never     Passive exposure: Never    Smokeless tobacco: Never   Substance and Sexual Activity    Alcohol use: Not Currently     Comment: rarely    Drug use: Not Currently     Social Drivers of Health     Financial Resource Strain: Low Risk  (3/26/2025)    Overall Financial Resource Strain (CARDIA)     Difficulty of Paying Living Expenses: Not hard at all   Food Insecurity: No Food Insecurity (3/26/2025)    Hunger Vital Sign     Worried About Running Out of Food in the Last Year: Never true     Ran Out of Food in the Last Year: Never true   Transportation Needs: No Transportation Needs (3/26/2025)    PRAPARE - Transportation     Lack of Transportation (Medical): No     Lack of Transportation (Non-Medical): No   Physical Activity: Insufficiently Active (3/26/2025)    Exercise Vital Sign     Days of Exercise per Week: 2 days     Minutes of Exercise per Session: 20 min    Stress: No Stress Concern Present (3/26/2025)    [employer] [place] [employer] - Occupational Stress Questionnaire     Feeling of Stress : Not at all   Housing Stability: Low Risk  (3/26/2025)    Housing Stability Vital Sign     Unable to Pay for Housing in the Last Year: No     Homeless in the Last Year: No

## 2025-04-02 ENCOUNTER — RESULTS FOLLOW-UP (OUTPATIENT)
Dept: FAMILY MEDICINE | Facility: CLINIC | Age: 60
End: 2025-04-02

## 2025-04-02 DIAGNOSIS — E78.2 MIXED HYPERLIPIDEMIA: Primary | ICD-10-CM

## 2025-04-02 RX ORDER — ATORVASTATIN CALCIUM 10 MG/1
10 TABLET, FILM COATED ORAL NIGHTLY
Qty: 90 TABLET | Refills: 3 | Status: SHIPPED | OUTPATIENT
Start: 2025-04-02 | End: 2026-04-02

## 2025-04-12 DIAGNOSIS — R39.198 DIFFICULTY VOIDING: ICD-10-CM

## 2025-04-14 ENCOUNTER — PATIENT MESSAGE (OUTPATIENT)
Dept: UROLOGY | Facility: CLINIC | Age: 60
End: 2025-04-14
Payer: COMMERCIAL

## 2025-04-14 RX ORDER — TAMSULOSIN HYDROCHLORIDE 0.4 MG/1
1 CAPSULE ORAL
Qty: 90 CAPSULE | Refills: 3 | Status: SHIPPED | OUTPATIENT
Start: 2025-04-14

## 2025-04-14 NOTE — TELEPHONE ENCOUNTER
Scheduled pt yearly f/u for his request for flomax. Pt also states during his apt with Dr. Lindsey a CT was suggested, however pt was not ready at that time, however now he states he would like to get that CT scheduled. I will send him the scheduling number through [employer] portal as requested.

## 2025-04-17 ENCOUNTER — PATIENT MESSAGE (OUTPATIENT)
Dept: UROLOGY | Facility: CLINIC | Age: 60
End: 2025-04-17
Payer: COMMERCIAL

## 2025-04-17 DIAGNOSIS — R31.29 HEMATURIA, MICROSCOPIC: Primary | ICD-10-CM

## 2025-04-24 ENCOUNTER — RESULTS FOLLOW-UP (OUTPATIENT)
Dept: UROLOGY | Facility: CLINIC | Age: 60
End: 2025-04-24

## 2025-04-25 ENCOUNTER — TELEPHONE (OUTPATIENT)
Dept: UROLOGY | Facility: CLINIC | Age: 60
End: 2025-04-25
Payer: COMMERCIAL

## 2025-04-25 NOTE — TELEPHONE ENCOUNTER
Spoke with pt in regards to CT results. Pt verbalized what the next steps are to treat prostatitis, although he is currently doing well after this previous treatment of antibiotics. Informed pt if he begins having s/s of prostatitis he can contact our clinic and we can assist him with treatment or an appt. Verbalized understanding.       ----- Message from Benedicto Lindsey MD sent at 4/24/2025  8:11 AM CDT -----  Please inform the patient that there were no abnormalities noted on his CT scan  ----- Message -----  From: Nicki Novoa LPN  Sent: 4/23/2025   9:16 AM CDT  To: Benedicto Lindsey MD

## 2025-06-09 ENCOUNTER — TELEPHONE (OUTPATIENT)
Dept: GASTROENTEROLOGY | Facility: CLINIC | Age: 60
End: 2025-06-09
Payer: COMMERCIAL

## 2025-06-09 NOTE — TELEPHONE ENCOUNTER
Pre chart notes:  Colonoscopy w Bx. 8.18.2023  His colon polyps were benign. Recommend repeat colonoscopy in 3 years. Did the Linzess 290 (up from 145) help his BMs? Pending final results from colon transit study.     5/30/2023 CBC, BMP WNL  2/22023 CMP WNL     12/02/2022 CT A/P w/ w/o:subcentimeter hypoattenuating lesions in both kidneys.No bowel wall thickening.     5/19/2022 CT A/P w/ and w/o: 2 subcentimeter hypodensities in R kidney. Mild long segment wall thickening of distal descending colon and rectosigmoid.     1/31/2022 Cologuard- Negative

## 2025-06-11 ENCOUNTER — TELEPHONE (OUTPATIENT)
Dept: GASTROENTEROLOGY | Facility: CLINIC | Age: 60
End: 2025-06-11

## 2025-06-11 NOTE — TELEPHONE ENCOUNTER
Pre chart:  Colonoscopy 8/21/2023: 1 TVA, 1 hyp, repeat colonoscopy in 3 years.      8/28/2023 Sitz day 6: Large amount of stool throughout colon. 1 retained in rectum.   8/22/2023 Sitz marker Day 0: Moderate volume of retained stool in colon      12/02/2022 CT A/P w/ w/o:subcentimeter hypoattenuating lesions in both kidneys.No bowel wall thickening.  5/19/2022 CT A/P w/ and w/o: 2 subcentimeter hypodensities in R kidney. Mild long segment wall thickening of distal descending colon and rectosigmoid.     1/31/2022 Cologuard- Negative

## 2025-06-12 ENCOUNTER — TELEPHONE (OUTPATIENT)
Dept: GASTROENTEROLOGY | Facility: CLINIC | Age: 60
End: 2025-06-12

## 2025-06-12 ENCOUNTER — OFFICE VISIT (OUTPATIENT)
Dept: GASTROENTEROLOGY | Facility: CLINIC | Age: 60
End: 2025-06-12
Payer: COMMERCIAL

## 2025-06-12 VITALS
DIASTOLIC BLOOD PRESSURE: 78 MMHG | HEIGHT: 71 IN | SYSTOLIC BLOOD PRESSURE: 122 MMHG | BODY MASS INDEX: 19.43 KG/M2 | HEART RATE: 79 BPM | OXYGEN SATURATION: 99 % | WEIGHT: 138.81 LBS

## 2025-06-12 DIAGNOSIS — Z86.0100 HISTORY OF COLON POLYPS: ICD-10-CM

## 2025-06-12 DIAGNOSIS — K58.1 IRRITABLE BOWEL SYNDROME WITH CONSTIPATION: Primary | ICD-10-CM

## 2025-06-12 PROCEDURE — 3074F SYST BP LT 130 MM HG: CPT | Mod: CPTII,,, | Performed by: INTERNAL MEDICINE

## 2025-06-12 PROCEDURE — 1159F MED LIST DOCD IN RCRD: CPT | Mod: CPTII,,, | Performed by: INTERNAL MEDICINE

## 2025-06-12 PROCEDURE — 3078F DIAST BP <80 MM HG: CPT | Mod: CPTII,,, | Performed by: INTERNAL MEDICINE

## 2025-06-12 PROCEDURE — 1160F RVW MEDS BY RX/DR IN RCRD: CPT | Mod: CPTII,,, | Performed by: INTERNAL MEDICINE

## 2025-06-12 PROCEDURE — 99213 OFFICE O/P EST LOW 20 MIN: CPT | Mod: S$PBB,,, | Performed by: INTERNAL MEDICINE

## 2025-06-12 PROCEDURE — 3008F BODY MASS INDEX DOCD: CPT | Mod: CPTII,,, | Performed by: INTERNAL MEDICINE

## 2025-06-12 NOTE — LETTER
June 12, 2025        Bel Bowser, NP  [address]  [address]  [place] [address]             [facility]  [address] [address]  Phone: [phone]  Fax: [phone]   Patient: Jose Acosta   MR Number: [id number]   YOB: 1965   Date of Visit: 6/12/2025       Dear Dr. Bowser:    Thank you for referring Jose Acosta to me for evaluation. Below are the relevant portions of my assessment and plan of care.            If you have questions, please do not hesitate to call me. I look forward to following Jose along with you.    Sincerely,      Augustina Rasmussen MD           CC  No Recipients

## 2025-06-12 NOTE — TELEPHONE ENCOUNTER
Patient was given colonoscopy date for in the future. Patient will get instructions at next apt. Patient was given AVS with apt details. 6/12/25 LRA

## 2025-06-12 NOTE — PROGRESS NOTES
Clinic Note    Reason for visit:  The primary encounter diagnosis was Irritable bowel syndrome with constipation. A diagnosis of History of colon polyps was also pertinent to this visit.    PCP: Bel Bowser       HPI:  This is a 59 y.o. male here for follow up. Patient with IBS-C. Was on Linzess 290mcg daily but he is taking Mag-O7 OTC supplement now and it is working well for him. Had diarrhea with Linzess. No blood in stool. Doing well.         Colonoscopy 8/21/2023: 1 TVA, 1 hyp, repeat colonoscopy in 3 years.      8/28/2023 Sitz day 6: Large amount of stool throughout colon. 1 retained in rectum.   8/22/2023 Sitz marker Day 0: Moderate volume of retained stool in colon      12/02/2022 CT A/P w/ w/o:subcentimeter hypoattenuating lesions in both kidneys.No bowel wall thickening.  5/19/2022 CT A/P w/ and w/o: 2 subcentimeter hypodensities in R kidney. Mild long segment wall thickening of distal descending colon and rectosigmoid.     1/31/2022 Cologuard- Negative    Review of Systems   Constitutional:  Negative for diaphoresis, fatigue, fever and unexpected weight change.   HENT:  Positive for mouth sores and postnasal drip. Negative for hearing loss, sore throat and trouble swallowing.    Eyes:  Negative for pain, discharge and eye dryness.   Respiratory:  Negative for apnea, cough, choking, chest tightness, shortness of breath and wheezing.    Cardiovascular:  Negative for chest pain, palpitations and leg swelling.   Gastrointestinal:  Negative for abdominal distention, abdominal pain, anal bleeding, blood in stool, change in bowel habit, constipation, diarrhea, nausea, rectal pain, vomiting, reflux and fecal incontinence.   Genitourinary:  Negative for bladder incontinence, dysuria and hematuria.   Musculoskeletal:  Negative for arthralgias, back pain and joint swelling.   Integumentary:  Negative for color change and rash.   Allergic/Immunologic: Negative for environmental allergies and food allergies.  "  Neurological:  Negative for seizures and headaches.   Hematological:  Negative for adenopathy. Does not bruise/bleed easily.        Past Medical History:   Diagnosis Date    Allergy     BMI < 18.5 06/12/2025    BPH (benign prostatic hyperplasia)     Chronic constipation     Hypochloremia      Past Surgical History:   Procedure Laterality Date    right great toe fusion      plate with screws placed    TONSILLECTOMY      TRANSURETHRAL RESECTION OF PROSTATE  11/06/2024    Dr. Lindsey     Family History   Problem Relation Name Age of Onset    No Known Problems Mother      Cancer Father      Colon cancer Neg Hx      Esophageal cancer Neg Hx      Liver disease Neg Hx      Rectal cancer Neg Hx       Social History[1]  Review of patient's allergies indicates:   Allergen Reactions    Codeine         Medication List with Changes/Refills   Current Medications    ATORVASTATIN (LIPITOR) 10 MG TABLET    Take 1 tablet (10 mg total) by mouth every evening.    AZELASTINE (ASTELIN) 137 MCG (0.1 %) NASAL SPRAY        CELECOXIB (CELEBREX) 100 MG CAPSULE    Take 1 capsule (100 mg total) by mouth 2 (two) times daily.    CYCLOBENZAPRINE (FLEXERIL) 10 MG TABLET    Take 1 tablet (10 mg total) by mouth 2 (two) times daily as needed for Muscle spasms.    MAGNESIUM OXIDE (MAG-OX) 400 MG (241.3 MG MAGNESIUM) TABLET    Take 400 mg by mouth once daily.    POTASSIUM CHLORIDE SA (K-DUR,KLOR-CON) 20 MEQ TABLET    TAKE 1 TABLET BY MOUTH EVERY DAY    TAMSULOSIN (FLOMAX) 0.4 MG CAP    TAKE 1 CAPSULE BY MOUTH EVERY DAY         Vital Signs:  /78 (BP Location: Left arm, Patient Position: Sitting)   Pulse 79   Ht 5' 11" (1.803 m)   Wt 63 kg (138 lb 12.8 oz)   SpO2 99%   BMI 19.36 kg/m²         Physical Exam  Constitutional:       General: He is not in acute distress.     Appearance: Normal appearance. He is well-developed. He is not ill-appearing or toxic-appearing.   HENT:      Head: Normocephalic and atraumatic.      Nose: Nose normal.      " Mouth/Throat:      Mouth: Mucous membranes are moist.      Pharynx: Oropharynx is clear. No oropharyngeal exudate or posterior oropharyngeal erythema.   Eyes:      General: Lids are normal. No scleral icterus.        Right eye: No discharge.         Left eye: No discharge.      Conjunctiva/sclera: Conjunctivae normal.   Cardiovascular:      Rate and Rhythm: Normal rate and regular rhythm.      Pulses:           Radial pulses are 2+ on the right side and 2+ on the left side.   Pulmonary:      Effort: Pulmonary effort is normal. No respiratory distress.      Breath sounds: No stridor. No wheezing.   Abdominal:      General: Bowel sounds are normal. There is no distension.      Palpations: Abdomen is soft. There is no fluid wave, hepatomegaly, splenomegaly or mass.      Tenderness: There is no abdominal tenderness. There is no guarding or rebound.   Lymphadenopathy:      Cervical: No cervical adenopathy.   Skin:     General: Skin is warm and dry.      Capillary Refill: Capillary refill takes less than 2 seconds.      Coloration: Skin is not cyanotic, jaundiced or pale.   Neurological:      General: No focal deficit present.      Mental Status: He is alert and oriented to person, place, and time.   Psychiatric:         Mood and Affect: Mood normal.         Behavior: Behavior is cooperative.            All of the data above and below has been reviewed by myself and any further interpretations will be reflected in the assessment and plan.   The data includes review of external notes, and independent interpretation of lab results, procedures, x-rays, and imaging reports.      Assessment:  Irritable bowel syndrome with constipation    History of colon polyps    IBS-C- Was on Linzess 290 mcg but caused diarrhea. Now on OTC Mag-O7 and this is working well. Prefers to keep one yr OV in place.  Colon due 8/2026. Will pick a date now. Will sign orders at next OV.     Recommendations:    Notify my office with any issues.    Schedule colonoscopy for 8/2026.    If any tests, procedures, or imaging has been ordered and you are not contacted to schedule within 1-2 weeks, then you may call the central scheduling department directly at [phone].     Risks, benefits, and alternatives of medical management, any associated procedures, and/or treatment discussed with the patient. Patient given opportunity to ask questions and voices understanding. Patient has elected to proceed with the recommended care modalities as discussed.    Instructed patient to notify my office if they have not been contacted within two weeks after any procedures, submitting any samples (biopsies, blood, stool, urine, etc.) or after any imaging (X-ray, CT, MRI, etc.).     Follow up in about 1 year (around 6/12/2026).    Order summary:  No orders of the defined types were placed in this encounter.     This assessment, plan, and documentation was performed in collaboration with Jody Chen NP.     This document may have been created using a voice recognition transcribing system. Incorrect words or phrases may have been missed during proofreading. Please interpret accordingly or contact me for clarification.     Augustina Rasmussen MD         [1]   Social History  Tobacco Use    Smoking status: Never     Passive exposure: Never    Smokeless tobacco: Never   Substance Use Topics    Alcohol use: Not Currently     Comment: rarely    Drug use: Not Currently

## 2025-07-01 ENCOUNTER — TELEPHONE (OUTPATIENT)
Dept: UROLOGY | Facility: CLINIC | Age: 60
End: 2025-07-01

## 2025-07-01 ENCOUNTER — PATIENT MESSAGE (OUTPATIENT)
Dept: UROLOGY | Facility: CLINIC | Age: 60
End: 2025-07-01

## 2025-07-01 DIAGNOSIS — N41.9 PROSTATITIS, UNSPECIFIED PROSTATITIS TYPE: Primary | ICD-10-CM

## 2025-07-01 RX ORDER — CEFDINIR 300 MG/1
300 CAPSULE ORAL 2 TIMES DAILY
Qty: 60 CAPSULE | Refills: 0 | Status: SHIPPED | OUTPATIENT
Start: 2025-07-01 | End: 2025-07-31

## 2025-07-01 NOTE — TELEPHONE ENCOUNTER
Spoke with pt and informed him we are sending out abx to his pharmacy for prostatitis flare up. I have offered gentamicin injection. Pt will be in tomorrow for this. I have scheduled him for 3:00.

## 2025-07-02 ENCOUNTER — CLINICAL SUPPORT (OUTPATIENT)
Dept: UROLOGY | Facility: CLINIC | Age: 60
End: 2025-07-02
Payer: COMMERCIAL

## 2025-07-02 DIAGNOSIS — N41.9 PROSTATITIS, UNSPECIFIED PROSTATITIS TYPE: Primary | ICD-10-CM

## 2025-07-02 LAB
BILIRUBIN, UA POC OHS: NEGATIVE
BLOOD, UA POC OHS: ABNORMAL
CLARITY, UA POC OHS: ABNORMAL
COLOR, UA POC OHS: YELLOW
GLUCOSE, UA POC OHS: NEGATIVE
KETONES, UA POC OHS: NEGATIVE
LEUKOCYTES, UA POC OHS: NEGATIVE
NITRITE, UA POC OHS: NEGATIVE
PH, UA POC OHS: 7
PROTEIN, UA POC OHS: NEGATIVE
SPECIFIC GRAVITY, UA POC OHS: 1.02
UROBILINOGEN, UA POC OHS: 0.2

## 2025-07-02 RX ORDER — GENTAMICIN 40 MG/ML
80 INJECTION, SOLUTION INTRAMUSCULAR; INTRAVENOUS
Status: COMPLETED | OUTPATIENT
Start: 2025-07-02 | End: 2025-07-02

## 2025-07-02 RX ADMIN — GENTAMICIN 80 MG: 40 INJECTION, SOLUTION INTRAMUSCULAR; INTRAVENOUS at 03:07

## 2025-07-29 DIAGNOSIS — N41.9 PROSTATITIS, UNSPECIFIED PROSTATITIS TYPE: ICD-10-CM

## 2025-07-29 RX ORDER — CEFDINIR 300 MG/1
300 CAPSULE ORAL 2 TIMES DAILY
Qty: 60 CAPSULE | Refills: 0 | Status: SHIPPED | OUTPATIENT
Start: 2025-07-29